# Patient Record
Sex: FEMALE | Race: WHITE | Employment: OTHER | URBAN - METROPOLITAN AREA
[De-identification: names, ages, dates, MRNs, and addresses within clinical notes are randomized per-mention and may not be internally consistent; named-entity substitution may affect disease eponyms.]

---

## 2019-08-30 ENCOUNTER — TELEPHONE (OUTPATIENT)
Dept: ORTHOPEDIC SURGERY | Age: 59
End: 2019-08-30

## 2019-09-03 DIAGNOSIS — Z96.653 HISTORY OF TOTAL BILATERAL KNEE REPLACEMENT: Primary | ICD-10-CM

## 2019-09-04 ENCOUNTER — OFFICE VISIT (OUTPATIENT)
Dept: ORTHOPEDIC SURGERY | Age: 59
End: 2019-09-04
Payer: COMMERCIAL

## 2019-09-04 VITALS — BODY MASS INDEX: 26.68 KG/M2 | WEIGHT: 166 LBS | HEIGHT: 66 IN

## 2019-09-04 DIAGNOSIS — Z96.653 HISTORY OF TOTAL BILATERAL KNEE REPLACEMENT: Primary | ICD-10-CM

## 2019-09-04 PROCEDURE — 99201 PR OFFICE OUTPATIENT NEW 10 MINUTES: CPT | Performed by: ORTHOPAEDIC SURGERY

## 2019-09-04 RX ORDER — ZOLPIDEM TARTRATE 10 MG/1
TABLET ORAL NIGHTLY PRN
COMMUNITY

## 2019-09-04 RX ORDER — FAMOTIDINE 20 MG/1
20 TABLET, FILM COATED ORAL 2 TIMES DAILY
COMMUNITY

## 2019-09-04 RX ORDER — CYCLOBENZAPRINE HCL 10 MG
10 TABLET ORAL 3 TIMES DAILY PRN
COMMUNITY

## 2019-09-04 NOTE — PROGRESS NOTES
History Narrative    Not on file       Past Medical History:   Diagnosis Date    Arthritis     Diverticulitis     Diverticulosis     Fibromyalgia      Past Surgical History:   Procedure Laterality Date    TOTAL KNEE ARTHROPLASTY Bilateral      Family History   Problem Relation Age of Onset    Diabetes Mother     Dementia Mother     Cancer Father     Heart Attack Brother          No orders of the defined types were placed in this encounter. 1. History of total bilateral knee replacement            Faheem Khan MD    Please note that this chart was generated using voice recognition Dragon dictation software. Although every effort was made to ensure the accuracy of this automated transcription, some errors in transcription may have occurred.

## 2019-10-09 ENCOUNTER — OFFICE VISIT (OUTPATIENT)
Dept: ORTHOPEDIC SURGERY | Age: 59
End: 2019-10-09
Payer: COMMERCIAL

## 2019-10-09 DIAGNOSIS — M25.562 CHRONIC PAIN OF BOTH KNEES: ICD-10-CM

## 2019-10-09 DIAGNOSIS — Z96.653 HISTORY OF TOTAL BILATERAL KNEE REPLACEMENT: Primary | ICD-10-CM

## 2019-10-09 DIAGNOSIS — M25.561 CHRONIC PAIN OF BOTH KNEES: ICD-10-CM

## 2019-10-09 DIAGNOSIS — G89.29 CHRONIC PAIN OF BOTH KNEES: ICD-10-CM

## 2019-10-09 PROCEDURE — 99212 OFFICE O/P EST SF 10 MIN: CPT | Performed by: ORTHOPAEDIC SURGERY

## 2019-10-15 ENCOUNTER — TELEPHONE (OUTPATIENT)
Dept: ORTHOPEDIC SURGERY | Age: 59
End: 2019-10-15

## 2019-10-15 DIAGNOSIS — Z79.2 PROPHYLACTIC ANTIBIOTIC: Primary | ICD-10-CM

## 2019-10-15 RX ORDER — AMOXICILLIN 500 MG/1
CAPSULE ORAL
Qty: 6 CAPSULE | Refills: 0 | Status: CANCELLED | OUTPATIENT
Start: 2019-10-15

## 2019-11-21 DIAGNOSIS — Z96.653 HISTORY OF TOTAL BILATERAL KNEE REPLACEMENT: Primary | ICD-10-CM

## 2019-11-21 RX ORDER — TRAMADOL HYDROCHLORIDE 50 MG/1
50-100 TABLET ORAL EVERY 6 HOURS PRN
Qty: 40 TABLET | Refills: 0 | Status: SHIPPED | OUTPATIENT
Start: 2019-11-21 | End: 2019-11-28

## 2019-11-22 ENCOUNTER — TELEPHONE (OUTPATIENT)
Dept: ORTHOPEDIC SURGERY | Age: 59
End: 2019-11-22

## 2019-12-05 ENCOUNTER — OFFICE VISIT (OUTPATIENT)
Dept: ORTHOPEDIC SURGERY | Age: 59
End: 2019-12-05

## 2019-12-05 DIAGNOSIS — Z96.653 HISTORY OF TOTAL BILATERAL KNEE REPLACEMENT: Primary | ICD-10-CM

## 2019-12-05 PROCEDURE — 99024 POSTOP FOLLOW-UP VISIT: CPT | Performed by: ORTHOPAEDIC SURGERY

## 2020-02-06 ENCOUNTER — OFFICE VISIT (OUTPATIENT)
Dept: ORTHOPEDIC SURGERY | Age: 60
End: 2020-02-06

## 2020-02-06 VITALS — BODY MASS INDEX: 26.68 KG/M2 | WEIGHT: 166.01 LBS | HEIGHT: 66 IN

## 2020-02-06 PROCEDURE — 99024 POSTOP FOLLOW-UP VISIT: CPT | Performed by: ORTHOPAEDIC SURGERY

## 2020-02-06 NOTE — PROGRESS NOTES
Anne Gilbert M.D.            [address] [place] [address], [address] [facility], [address] [place] [address]           Dept Phone: [phone]           Dept Fax:  [phone]           [place]          Dept Phone: [phone]           Dept Fax:  [phone]      Chief Compliant:  Chief Complaint   Patient presents with    Post-Op Check     2 month f/u 11/21/19 Lt patellar resurfacing        History of Present Illness:  Jose Billy returns today. This is a 60 y.o. female who presents to the clinic today  For 3 month follow up of status post left knee patellar surfacing on 11/21/2019. She states that she is doing great with no pain. She states that her right knee is now starting to bother her. She states she took off of school for 2 days due to pain in right knee. Review of Systems   Constitutional: Negative for fever, chills, sweats, recent illness, or recent injury. Neurological: Negative for headaches, numbness, or weakness. Integumentary: Negative for rash, itching, ecchymosis, abrasions, or laceration. Musculoskeletal: Positive for Post-Op Check (2 month f/u 11/21/19 Lt patellar resurfacing)       Physical Exam:  Constitutional: Patient is oriented to person, place, and time. Patient appears well-developed and well nourished. HENT: Negative otherwise noted  Head: Normocephalic and Atraumatic  Nose: Normal  Eyes: Conjunctivae and EOM are normal  Neck: Normal range of motion Neck supple. Respiratory/Cardio: Effort normal. No respiratory distress. Musculoskeletal:    Right knee: patellar clunk  After she hyperflexes her knee past 120 degree  Neurological: Patient is alert and oriented to person, place, and time. Normal strenght. No sensory deficit.   Skin: Skin is warm and dry  Psychiatric: Behavior is normal. Thought content normal.  Nursing note and vitals reviewed. Labs and Imaging:     None taken on today. Assessment and Plan:  1. Chronic pain of both knees            This is a 60 y.o. female who presents to the clinic today for follow up 3 month status post left knee patellar resurfacing on 11/21/2019. She is doing great with no pain post operatively. She was advised to sit with her right knee out straight. Follow up PRN.      Past History:    Current Outpatient Medications:     famotidine (PEPCID) 20 MG tablet, Take 20 mg by mouth 2 times daily, Disp: , Rfl:     zolpidem (AMBIEN) 10 MG tablet, Take by mouth nightly as needed for Sleep., Disp: , Rfl:     cyclobenzaprine (FLEXERIL) 10 MG tablet, Take 10 mg by mouth 3 times daily as needed for Muscle spasms, Disp: , Rfl:   Allergies   Allergen Reactions    Ceclor [Cefaclor] Hives and Swelling    Gluten Meal Other (See Comments)     Social History     Socioeconomic History    Marital status: Single     Spouse name: Not on file    Number of children: Not on file    Years of education: Not on file    Highest education level: Not on file   Occupational History    Not on file   Social Needs    Financial resource strain: Not on file    Food insecurity:     Worry: Not on file     Inability: Not on file    Transportation needs:     Medical: Not on file     Non-medical: Not on file   Tobacco Use    Smoking status: Never Smoker    Smokeless tobacco: Never Used   Substance and Sexual Activity    Alcohol use: Not on file    Drug use: Not on file    Sexual activity: Not on file   Lifestyle    Physical activity:     Days per week: Not on file     Minutes per session: Not on file    Stress: Not on file   Relationships    Social connections:     Talks on phone: Not on file     Gets together: Not on file     Attends Denominational service: Not on file     Active member of club or organization: Not on file     Attends meetings of clubs or organizations: Not on file     Relationship status: Not on file   Javier Renteria

## 2021-01-28 ENCOUNTER — OFFICE VISIT (OUTPATIENT)
Dept: ORTHOPEDIC SURGERY | Age: 61
End: 2021-01-28
Payer: COMMERCIAL

## 2021-01-28 DIAGNOSIS — Z96.653 HISTORY OF TOTAL BILATERAL KNEE REPLACEMENT: Primary | ICD-10-CM

## 2021-01-28 PROCEDURE — 99213 OFFICE O/P EST LOW 20 MIN: CPT | Performed by: ORTHOPAEDIC SURGERY

## 2021-01-28 PROCEDURE — 3017F COLORECTAL CA SCREEN DOC REV: CPT | Performed by: ORTHOPAEDIC SURGERY

## 2021-01-28 PROCEDURE — 1036F TOBACCO NON-USER: CPT | Performed by: ORTHOPAEDIC SURGERY

## 2021-01-28 PROCEDURE — G8484 FLU IMMUNIZE NO ADMIN: HCPCS | Performed by: ORTHOPAEDIC SURGERY

## 2021-01-28 PROCEDURE — G8419 CALC BMI OUT NRM PARAM NOF/U: HCPCS | Performed by: ORTHOPAEDIC SURGERY

## 2021-01-28 PROCEDURE — G8427 DOCREV CUR MEDS BY ELIG CLIN: HCPCS | Performed by: ORTHOPAEDIC SURGERY

## 2021-01-28 NOTE — PROGRESS NOTES
Yolanda Garcia M.D.            [address] [facility] [address] [facility],[address], [address] [facility]           Dept Phone: [phone]           Dept Fax:  [phone]           [place]          Dept Phone: [phone]           Dept Fax:  [phone]      Chief Compliant:  Chief Complaint   Patient presents with    Knee Pain     right         History of Present Illness: This is a 63 y.o. female who presents to the clinic today for evaluation / follow up of right knee pain. Please see prior dictations. Joo de la cruz had bilateral total knees done elsewhere. She had been seen by me approximately 2 years ago for significant patellofemoral pain on the left side. She had significant maltracking/tilt of the patella and we did going to do resurfacing of the patella on the left knee. She is extremely happy with this knee she has no problems whatsoever but she is now experiencing similar findings and feeling symptoms on the right side. She states that a certain degree flexion she gets a snapping catching popping sensation especially when rising out of a chair. Review of Systems   Constitutional: Negative for fever, chills, sweats. Eyes: Negative for changes in vision, or pain. HENT: Negative for ear ache, epistaxis, or sore throat. Respiratory/Cardio: Negative for Chest pain, palpitations, SOB, or cough. Gastrointestinal: Negative for abdominal pain, N/V/D. Genitourinary: Negative for dysuria, frequency, urgency, or hematuria. Neurological: Negative for headache, numbness, or weakness. Integumentary: Negative for rash, itching, laceration, or abrasion. Musculoskeletal: Positive for Knee Pain (right )       Physical Exam:  Constitutional: Patient is oriented to person, place, and time. Patient appears well-developed and well nourished. HENT: Negative otherwise noted  Head: Normocephalic and Atraumatic  Nose: Normal  Eyes: Conjunctivae and EOM are normal  Neck: Normal range of motion Neck supple. Respiratory/Cardio: Effort normal. No respiratory distress. Musculoskeletal: Examination of patient's right knee notes she has excellent motion 0135 degrees. She does have however going from flexion into extension the rather painful popping/clunking sensation. She does seem to track nicely however appears to be a engaging problem. She has no obvious varus and valgus instability no anterior posterior instability no other contributory findings  Neurological: Patient is alert and oriented to person, place, and time. Normal strenght. No sensory deficit. Skin: Skin is warm and dry  Psychiatric: Behavior is normal. Thought content normal.  Nursing note and vitals reviewed. Labs and Imaging:     XR taken today:  Xr Knee Bilateral Standing    Result Date: 1/28/2021  Rays taken today reviewed by me show AP lateral views of both knees as well as skyline views. AP lateral views were told unremarkable on either knee. Patient is skyline view does show on the right knee is some mild bony impingement along the medial facet. It is well stabilized and centralized within the trochlear groove        No orders of the defined types were placed in this encounter. Assessment and Plan:  Previous patellar resurfacing left knee  Patellar clunk syndrome right        This is a 63 y.o. female who presents to the clinic today for evaluation / follow up of painful right total knee patellar clunk.      Past History:    Current Outpatient Medications:     famotidine (PEPCID) 20 MG tablet, Take 20 mg by mouth 2 times daily, Disp: , Rfl:     zolpidem (AMBIEN) 10 MG tablet, Take by mouth nightly as needed for Sleep., Disp: , Rfl:     cyclobenzaprine (FLEXERIL) 10 MG tablet, Take 10 mg by mouth 3 times daily as needed for Muscle spasms, Disp: , Rfl:   Allergies Allergen Reactions    Ceclor [Cefaclor] Hives and Swelling    Gluten Meal Other (See Comments)     Social History     Socioeconomic History    Marital status: Single     Spouse name: Not on file    Number of children: Not on file    Years of education: Not on file    Highest education level: Not on file   Occupational History    Not on file   Social Needs    Financial resource strain: Not on file    Food insecurity     Worry: Not on file     Inability: Not on file    Transportation needs     Medical: Not on file     Non-medical: Not on file   Tobacco Use    Smoking status: Never Smoker    Smokeless tobacco: Never Used   Substance and Sexual Activity    Alcohol use: Not on file    Drug use: Not on file    Sexual activity: Not on file   Lifestyle    Physical activity     Days per week: Not on file     Minutes per session: Not on file    Stress: Not on file   Relationships    Social connections     Talks on phone: Not on file     Gets together: Not on file     Attends Religion service: Not on file     Active member of club or organization: Not on file     Attends meetings of clubs or organizations: Not on file     Relationship status: Not on file    Intimate partner violence     Fear of current or ex partner: Not on file     Emotionally abused: Not on file     Physically abused: Not on file     Forced sexual activity: Not on file   Other Topics Concern    Not on file   Social History Narrative    Not on file     Past Medical History:   Diagnosis Date    Arthritis     Diverticulitis     Diverticulosis     Fibromyalgia      Past Surgical History:   Procedure Laterality Date    TOTAL KNEE ARTHROPLASTY Bilateral      Family History   Problem Relation Age of Onset    Diabetes Mother     Dementia Mother     Cancer Father     Heart Attack Brother       Plan I did inform the patient that she has similar findings of what she had in her left side. As such I think that if she is symptomatically limited by this and then I would consider doing an identical procedure. She does seem to agree with this however if she would like to wait till later date especially regarding the return to school status. We will see her back here on an as-needed basis and/or she may call and schedule at her discretion    Provider Attestation:  Rocio Painting, personally performed the services described in this documentation. All medical record entries made by the scribe were at my direction and in my presence. I have reviewed the chart and discharge instructions and agree that the records reflect my personal performance and is accurate and complete. Kay Ramos MD. 01/28/21      Please note that this chart was generated using voice recognition Dragon dictation software. Although every effort was made to ensure the accuracy of this automated transcription, some errors in transcription may have occurred.

## 2021-03-29 ENCOUNTER — OFFICE VISIT (OUTPATIENT)
Dept: ORTHOPEDIC SURGERY | Age: 61
End: 2021-03-29
Payer: COMMERCIAL

## 2021-03-29 VITALS — HEIGHT: 67 IN | BODY MASS INDEX: 26.06 KG/M2 | WEIGHT: 166 LBS

## 2021-03-29 DIAGNOSIS — M25.361 PATELLAR INSTABILITY OF RIGHT KNEE: ICD-10-CM

## 2021-03-29 DIAGNOSIS — Z96.653 HISTORY OF TOTAL BILATERAL KNEE REPLACEMENT: Primary | ICD-10-CM

## 2021-03-29 PROCEDURE — G8427 DOCREV CUR MEDS BY ELIG CLIN: HCPCS | Performed by: ORTHOPAEDIC SURGERY

## 2021-03-29 PROCEDURE — 1036F TOBACCO NON-USER: CPT | Performed by: ORTHOPAEDIC SURGERY

## 2021-03-29 PROCEDURE — G8419 CALC BMI OUT NRM PARAM NOF/U: HCPCS | Performed by: ORTHOPAEDIC SURGERY

## 2021-03-29 PROCEDURE — G8484 FLU IMMUNIZE NO ADMIN: HCPCS | Performed by: ORTHOPAEDIC SURGERY

## 2021-03-29 PROCEDURE — 99214 OFFICE O/P EST MOD 30 MIN: CPT | Performed by: ORTHOPAEDIC SURGERY

## 2021-03-29 PROCEDURE — 3017F COLORECTAL CA SCREEN DOC REV: CPT | Performed by: ORTHOPAEDIC SURGERY

## 2021-03-29 ASSESSMENT — ENCOUNTER SYMPTOMS
APNEA: 0
CHEST TIGHTNESS: 0
COUGH: 0
VOMITING: 0
ABDOMINAL PAIN: 0

## 2021-03-29 NOTE — PROGRESS NOTES
[facility]  [facility]  [id number] [facility] [address]  Dept: [phone]  Dept Fax: [phone]        Ambulatory Follow Up      Subjective:   Kamilah Montalvo is a 63y.o. year old female who presents to our office today for routine followup regarding her   1. History of total bilateral knee replacement    2. Patellar instability of right knee    . Chief Complaint   Patient presents with    Knee Pain     right       HPI-this 59-year-old female patient presents to the office today for recheck of her bilateral knees. She underwent bilateral total knee arthroplasty in 2015 by Dr. Monica Wong. She was never really happy with either knee because of catching around the kneecaps. In 2019 she underwent revision patellar arthroplasty on the left with Dr. Paulina Mayes and she is extremely happy with the knee now. She feels like she is having the exact same symptoms on the right knee that have been persistent since her initial surgery in 2015. She noticed catching and locking of the patella and states that this has become painful and intolerable over time. She also feels like occasionally that her knee will catch and lock and cause her to feel unstable like she is going to fall because of it. She denies any fevers, chills, nausea, vomiting, diarrhea or any constitutional symptoms such as might be seen in infection. Review of Systems   Constitutional: Negative for chills and fever. Respiratory: Negative for apnea, cough and chest tightness. Cardiovascular: Negative for chest pain and palpitations. Gastrointestinal: Negative for abdominal pain and vomiting. Genitourinary: Negative for difficulty urinating. Musculoskeletal: Positive for arthralgias (right knee). Negative for gait problem, joint swelling and myalgias. Neurological: Negative for dizziness, weakness and numbness.        I have reviewed the CC, HPI, ROS, PMH, FHX, Social History, and if not present in this note, I have reviewed in the patient's chart. I agree with the documentation provided by other staff and have reviewed their documentation prior to providing my signature indicating agreement. Objective :   Ht 5' 7\" (1.702 m)   Wt 166 lb (75.3 kg)   BMI 26.00 kg/m²  Body mass index is 26 kg/m². General: Zofia Schmidt is a 63 y.o. female who is alert and oriented and sitting comfortably in our office. Ortho Exam  MS: Evaluation of the left knee reveals a nice well-healed midline incision. Patient has full range of motion of the left knee. Patellar tracking is appropriate without catching. Evaluation of the right knee reveals no significant outward deformity. Midline incision is well-healed. No signs of infection are noted. Catching of the patella when going from a flexed to an extended position and from an extended to a flexed position is noted. Palpable crepitance is appreciated. Tenderness over the patella is noted. No instability of the right knee is appreciated. Negative hip logroll and Stinchfield test is noted on the right. Motor, sensory, vascular examination of the right lower extremity is grossly intact without focal deficits. Neuro: alert and oriented to person and place. Eyes: Extra-ocular muscles intact  Mouth: Oral mucosa moist. No perioral lesions  Pulm: Respirations unlabored and regular. Symmetric chest excursion without outward deformity is noted. Skin: warm, well perfused  Psych:   Patient has good fund of knowledge and displays understanging of exam, diagnosis, and plan. Radiology:     Previous x-rays from 28 January 2021 were reviewed today. No significant deformity or signs of loosening or problems with the patella or the total knee arthroplasty are appreciated when reviewing the right knee x-rays from that date. Assessment:      1. History of total bilateral knee replacement    2.  Patellar instability of right knee       Plan:      Discussed etiology and natural history of patellar catching and locking. This may be from a cyclops lesion but it could also be from the patellar implant having complications. The treatment options may include oral anti-inflammatories, bracing, injections, advanced imaging, activity modification, physical therapy and/or surgical intervention. the patient notes the symptoms she is having on the right are exactly the same symptoms she has had on the left that improved 100% after undergoing revision patellar arthroplasty. She would like to proceed with revision patellar arthroplasty on the right. I spoke with the patient at length that this may not resolve the issue completely and she notes understanding. All details of the procedure, as well as risks, benefits and alternatives, including the option of non operative versus operative treatment were discussed. The patient understands that risks of the surgery include but are not limited to: bleeding, malunion/nonunion, loss of fixation, loss of reduction, hardware failure, angular or rotational deformity, length discrepancy, limp, transfusion, skin blistering or breakdown, progressive post traumatic degenerative joint disease, possible need for further surgery, bone grafting, infection, nerve injury, paralysis, numbness, blood vessel injury, excessive scaring, wound complication or breakdown, failure of symptoms to improve or actual deterioration in condition, significant acute and/or chronic pain, possible need for amputation, permanent loss of motion, and permanent loss of function.   As well as the general complications of anesthesia, which include but are not limited to: myocardial infarction and/or heart attack, stroke, multi organ system failure or even possible death, prolonged hospital stay, blood clots, pulmonary embolism, abnormal reaction to medication, visual and neurological disturbances, constipation, ischemic bowel, bowel obstruction, bowel perforation, ileus and mental status changes. No guarantees were made. Follow up:No follow-ups on file. No orders of the defined types were placed in this encounter. No orders of the defined types were placed in this encounter. Lucy Galvez LPN am scribing for and in the presence of Dr. Kanika Jay  3/29/2021 8:55 AM      I have reviewed and made changes accordingly to the work scribed by Tristan Mora LPN. The documentation accurately reflects work and decisions made by me. I have also reviewed documentation completed by clinical staff.     Kanika Jay DO, [address] [facility]  3/29/2021 9:01 AM    This note is created with the assistance of a speech recognition program.  While intending to generate a document that actually reflects the content of the visit, the document can still have some errors including those of syntax and sound a like substitutions which may escape proof reading.  In such instances, actual meaning can be extrapolated by contextual diversion      Electronically signed by Corinda Nissen on 3/29/2021 at 8:55 AM

## 2021-04-02 ENCOUNTER — TELEPHONE (OUTPATIENT)
Dept: ORTHOPEDIC SURGERY | Age: 61
End: 2021-04-02

## 2021-04-02 DIAGNOSIS — Z01.818 PRE-OP TESTING: Primary | ICD-10-CM

## 2021-04-05 DIAGNOSIS — Z96.653 HISTORY OF TOTAL BILATERAL KNEE REPLACEMENT: Primary | ICD-10-CM

## 2021-04-05 RX ORDER — AMOXICILLIN 500 MG/1
CAPSULE ORAL
Qty: 6 CAPSULE | Refills: 0 | Status: SHIPPED | OUTPATIENT
Start: 2021-04-05 | End: 2021-10-04 | Stop reason: SDUPTHER

## 2021-05-18 ASSESSMENT — PROMIS GLOBAL HEALTH SCALE
TO WHAT EXTENT ARE YOU ABLE TO CARRY OUT YOUR EVERYDAY PHYSICAL ACTIVITIES SUCH AS WALKING, CLIMBING STAIRS, CARRYING GROCERIES, OR MOVING A CHAIR [ON A SCALE OF 1 (NOT AT ALL) TO 5 (COMPLETELY)]?: 4
SUM OF RESPONSES TO QUESTIONS 2, 4, 5, & 10: 15
HOW IS THE PROMIS V1.1 BEING ADMINISTERED?: 2
IN GENERAL, WOULD YOU SAY YOUR QUALITY OF LIFE IS...[ON A SCALE OF 1 (POOR) TO 5 (EXCELLENT)]: 4
IN GENERAL, HOW WOULD YOU RATE YOUR SATISFACTION WITH YOUR SOCIAL ACTIVITIES AND RELATIONSHIPS [ON A SCALE OF 1 (POOR) TO 5 (EXCELLENT)]?: 4
WHO IS THE PERSON COMPLETING THE PROMIS V1.1 SURVEY?: 0
IN GENERAL, HOW WOULD YOU RATE YOUR PHYSICAL HEALTH [ON A SCALE OF 1 (POOR) TO 5 (EXCELLENT)]?: 5
IN THE PAST 7 DAYS, HOW OFTEN HAVE YOU BEEN BOTHERED BY EMOTIONAL PROBLEMS, SUCH AS FEELING ANXIOUS, DEPRESSED, OR IRRITABLE [ON A SCALE FROM 1 (NEVER) TO 5 (ALWAYS)]?: 3
IN GENERAL, PLEASE RATE HOW WELL YOU CARRY OUT YOUR USUAL SOCIAL ACTIVITIES (INCLUDES ACTIVITIES AT HOME, AT WORK, AND IN YOUR COMMUNITY, AND RESPONSIBILITIES AS A PARENT, CHILD, SPOUSE, EMPLOYEE, FRIEND, ETC) [ON A SCALE OF 1 (POOR) TO 5 (EXCELLENT)]?: 4
IN GENERAL, HOW WOULD YOU RATE YOUR MENTAL HEALTH, INCLUDING YOUR MOOD AND YOUR ABILITY TO THINK [ON A SCALE OF 1 (POOR) TO 5 (EXCELLENT)]?: 4
SUM OF RESPONSES TO QUESTIONS 3, 6, 7, & 8: 19
IN GENERAL, WOULD YOU SAY YOUR HEALTH IS...[ON A SCALE OF 1 (POOR) TO 5 (EXCELLENT)]: 5
IN THE PAST 7 DAYS, HOW WOULD YOU RATE YOUR FATIGUE ON AVERAGE [ON A SCALE FROM 1 (NONE) TO 5 (VERY SEVERE)]?: 3
IN THE PAST 7 DAYS, HOW WOULD YOU RATE YOUR PAIN ON AVERAGE [ON A SCALE FROM 0 (NO PAIN) TO 10 (WORST IMAGINABLE PAIN)]?: 7

## 2021-05-18 ASSESSMENT — KOOS JR
RISING FROM SITTING: 2
GOING UP OR DOWN STAIRS: 0
BENDING TO THE FLOOR TO PICK UP OBJECT: 2
HOW SEVERE IS YOUR KNEE STIFFNESS AFTER FIRST WAKING IN MORNING: 1
TWISING OR PIVOTING ON KNEE: 1
STANDING UPRIGHT: 0
STRAIGHTENING KNEE FULLY: 2

## 2021-05-24 RX ORDER — SODIUM CHLORIDE, SODIUM LACTATE, POTASSIUM CHLORIDE, CALCIUM CHLORIDE 600; 310; 30; 20 MG/100ML; MG/100ML; MG/100ML; MG/100ML
1000 INJECTION, SOLUTION INTRAVENOUS CONTINUOUS
Status: CANCELLED | OUTPATIENT
Start: 2021-05-24

## 2021-05-26 ENCOUNTER — HOSPITAL ENCOUNTER (OUTPATIENT)
Dept: GENERAL RADIOLOGY | Age: 61
Discharge: HOME | End: 2021-05-28
Payer: COMMERCIAL

## 2021-05-26 ENCOUNTER — HOSPITAL ENCOUNTER (OUTPATIENT)
Dept: PREADMISSION TESTING | Age: 61
Discharge: HOME | End: 2021-05-30
Payer: COMMERCIAL

## 2021-05-26 VITALS
WEIGHT: 164 LBS | TEMPERATURE: 97 F | DIASTOLIC BLOOD PRESSURE: 76 MMHG | BODY MASS INDEX: 25.74 KG/M2 | HEIGHT: 67 IN | HEART RATE: 85 BPM | RESPIRATION RATE: 18 BRPM | OXYGEN SATURATION: 99 % | SYSTOLIC BLOOD PRESSURE: 129 MMHG

## 2021-05-26 DIAGNOSIS — Z01.818 PRE-OP TESTING: ICD-10-CM

## 2021-05-26 LAB
ALBUMIN SERPL-MCNC: 4.5 G/DL (ref 3.5–5.2)
ALBUMIN/GLOBULIN RATIO: 1.3 (ref 1–2.5)
ALP BLD-CCNC: 96 U/L (ref 35–104)
ALT SERPL-CCNC: 22 U/L (ref 5–33)
ANION GAP SERPL CALCULATED.3IONS-SCNC: 7 MMOL/L (ref 9–17)
AST SERPL-CCNC: 32 U/L
BILIRUB SERPL-MCNC: 0.21 MG/DL (ref 0.3–1.2)
BILIRUBIN URINE: NEGATIVE
BUN BLDV-MCNC: 10 MG/DL (ref 8–23)
BUN/CREAT BLD: ABNORMAL (ref 9–20)
CALCIUM SERPL-MCNC: 9.5 MG/DL (ref 8.6–10.4)
CHLORIDE BLD-SCNC: 101 MMOL/L (ref 98–107)
CO2: 28 MMOL/L (ref 20–31)
COLOR: YELLOW
COMMENT UA: ABNORMAL
CREAT SERPL-MCNC: 0.58 MG/DL (ref 0.5–0.9)
GFR AFRICAN AMERICAN: >60 ML/MIN
GFR NON-AFRICAN AMERICAN: >60 ML/MIN
GFR SERPL CREATININE-BSD FRML MDRD: ABNORMAL ML/MIN/{1.73_M2}
GFR SERPL CREATININE-BSD FRML MDRD: ABNORMAL ML/MIN/{1.73_M2}
GLUCOSE BLD-MCNC: 95 MG/DL (ref 70–99)
GLUCOSE URINE: NEGATIVE
HCT VFR BLD CALC: 43.7 % (ref 36.3–47.1)
HEMOGLOBIN: 14 G/DL (ref 11.9–15.1)
KETONES, URINE: NEGATIVE
LEUKOCYTE ESTERASE, URINE: NEGATIVE
MCH RBC QN AUTO: 30.4 PG (ref 25.2–33.5)
MCHC RBC AUTO-ENTMCNC: 32 G/DL (ref 28.4–34.8)
MCV RBC AUTO: 95 FL (ref 82.6–102.9)
NITRITE, URINE: NEGATIVE
NRBC AUTOMATED: 0 PER 100 WBC
PDW BLD-RTO: 13 % (ref 11.8–14.4)
PH UA: 5.5 (ref 5–8)
PLATELET # BLD: 326 K/UL (ref 138–453)
PMV BLD AUTO: 9.7 FL (ref 8.1–13.5)
POTASSIUM SERPL-SCNC: 4.5 MMOL/L (ref 3.7–5.3)
PROTEIN UA: NEGATIVE
RBC # BLD: 4.6 M/UL (ref 3.95–5.11)
SODIUM BLD-SCNC: 136 MMOL/L (ref 135–144)
SPECIFIC GRAVITY UA: 1 (ref 1–1.03)
TOTAL PROTEIN: 8.1 G/DL (ref 6.4–8.3)
TURBIDITY: CLEAR
URINE HGB: NEGATIVE
UROBILINOGEN, URINE: NORMAL
WBC # BLD: 7 K/UL (ref 3.5–11.3)

## 2021-05-26 PROCEDURE — 85027 COMPLETE CBC AUTOMATED: CPT

## 2021-05-26 PROCEDURE — 71046 X-RAY EXAM CHEST 2 VIEWS: CPT

## 2021-05-26 PROCEDURE — 80053 COMPREHEN METABOLIC PANEL: CPT

## 2021-05-26 PROCEDURE — 93005 ELECTROCARDIOGRAM TRACING: CPT

## 2021-05-26 PROCEDURE — 87641 MR-STAPH DNA AMP PROBE: CPT

## 2021-05-26 PROCEDURE — 36415 COLL VENOUS BLD VENIPUNCTURE: CPT

## 2021-05-26 PROCEDURE — 81003 URINALYSIS AUTO W/O SCOPE: CPT

## 2021-05-26 RX ORDER — OMEGA-3 FATTY ACIDS/FISH OIL 300-1000MG
1 CAPSULE ORAL NIGHTLY
COMMUNITY

## 2021-05-26 RX ORDER — ONDANSETRON 4 MG/1
TABLET, ORALLY DISINTEGRATING ORAL
COMMUNITY
Start: 2021-03-08

## 2021-05-26 ASSESSMENT — PAIN DESCRIPTION - DESCRIPTORS: DESCRIPTORS: ACHING

## 2021-05-26 ASSESSMENT — PAIN DESCRIPTION - PAIN TYPE: TYPE: CHRONIC PAIN

## 2021-05-26 ASSESSMENT — PAIN DESCRIPTION - FREQUENCY: FREQUENCY: CONTINUOUS

## 2021-05-26 ASSESSMENT — PAIN SCALES - GENERAL: PAINLEVEL_OUTOF10: 2

## 2021-05-26 NOTE — H&P
History and Physical    Pt Name: Glendy Camarillo  MRN: [id number]  YOB: 1960  Date of evaluation: 5/26/2021  Primary Care Physician: JENN Vargas - CNP    SUBJECTIVE:   History of Chief Complaint:    Glendy Camarillo is a 63 y.o. female who presents for PAT appointment. Patient complains of right patellar instability. She reports being s/p bilateral total knee replacement in 2015 with Dr. Lance Ko. She then had left revision surgery with Dr. Meme Francisco with satisfactory results. Patient has been scheduled for PATELLAR REVISION ARTHROPLASTY- RIGHT. Allergies  is allergic to ceclor [cefaclor] and gluten meal.  Medications  Prior to Admission medications    Medication Sig Start Date End Date Taking? Authorizing Provider   Hyoscyamine Sulfate (LEVSIN/SL SL) Place under the tongue as needed   Yes Historical Provider, MD   Polyethylene Glycol 3350 (MIRALAX PO) Take by mouth every other day   Yes Historical Provider, MD   Probiotic Product (PROBIOTIC DAILY PO) Take by mouth Daily   Yes Historical Provider, MD   BIOTIN PO Take by mouth Daily   Yes Historical Provider, MD   Methylcellulose, Laxative, (CITRUCEL PO) Take by mouth nightly   Yes Historical Provider, MD   ibuprofen (ADVIL) 200 MG CAPS Take 1 capsule by mouth nightly   Yes Historical Provider, MD   amoxicillin (AMOXIL) 500 MG capsule Take 2 caps 1 hour prior to appointment, then 1 cap twice a day until gone. 4/5/21  Yes TRACI Sol   famotidine (PEPCID) 20 MG tablet Take 20 mg by mouth 2 times daily   Yes Historical Provider, MD   zolpidem (AMBIEN) 10 MG tablet Take by mouth nightly as needed for Sleep.    Yes Historical Provider, MD   cyclobenzaprine (FLEXERIL) 10 MG tablet Take 10 mg by mouth 3 times daily as needed for Muscle spasms   Yes Historical Provider, MD   ondansetron (ZOFRAN-ODT) 4 MG disintegrating tablet dissolve 1 tablet ON TONGUE every 8 hours if needed for nausea OR vomiting 3/8/21   Historical Provider, MD Past Medical History    has a past medical history of Arthritis, Diverticulitis, Diverticulosis, Fibrocystic breast, Fibromyalgia, Health care maintenance, IBS (irritable bowel syndrome), PONV (postoperative nausea and vomiting), and Sleep apnea. Past Surgical History   has a past surgical history that includes Total knee arthroplasty (Bilateral); Colonoscopy; Endoscopy, colon, diagnostic; Cholecystectomy; Breast lumpectomy (Bilateral); Wrist surgery (Right); and LASIK (Bilateral). Social History   reports that she has never smoked. She has never used smokeless tobacco.    reports current alcohol use.    has no history on file for drug use. 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 Review of Systems:  CONSTITUTIONAL:   negative for fevers, chills, fatigue and malaise    EYES:   negative for double vision, blurred vision and photophobia    HEENT:   negative for tinnitus, epistaxis and sore throat     RESPIRATORY:   negative for cough, shortness of breath, wheezing     CARDIOVASCULAR:   negative for chest pain, palpitations, syncope, edema     GASTROINTESTINAL:   negative for nausea, vomiting     GENITOURINARY:   negative for incontinence     MUSCULOSKELETAL:   +chronic pain, fibromyalgia   NEUROLOGICAL:   Negative for weakness and tingling  negative for headaches and dizziness     PSYCHIATRIC:   negative for anxiety       OBJECTIVE:   VITALS:  height is 5' 7\" (1.702 m) and weight is 164 lb (74.4 kg). Her temporal temperature is 97 °F (36.1 °C). Her blood pressure is 129/76 and her pulse is 85. Her respiration is 18 and oxygen saturation is 99%. CONSTITUTIONAL:alert & oriented x 3, no acute distress. Friendly.

## 2021-05-26 NOTE — H&P (VIEW-ONLY)
History and Physical    Pt Name: Luis Hitchcock  MRN: [id number]  YOB: 1960  Date of evaluation: 5/26/2021  Primary Care Physician: JENN Valentine - CNP    SUBJECTIVE:   History of Chief Complaint:    Luis Hitchcock is a 63 y.o. female who presents for PAT appointment. Patient complains of right patellar instability. She reports being s/p bilateral total knee replacement in 2015 with Dr. Mila Willingham. She then had left revision surgery with Dr. Deann Tian with satisfactory results. Patient has been scheduled for PATELLAR REVISION ARTHROPLASTY- RIGHT. Allergies  is allergic to ceclor [cefaclor] and gluten meal.  Medications  Prior to Admission medications    Medication Sig Start Date End Date Taking? Authorizing Provider   Hyoscyamine Sulfate (LEVSIN/SL SL) Place under the tongue as needed   Yes Historical Provider, MD   Polyethylene Glycol 3350 (MIRALAX PO) Take by mouth every other day   Yes Historical Provider, MD   Probiotic Product (PROBIOTIC DAILY PO) Take by mouth Daily   Yes Historical Provider, MD   BIOTIN PO Take by mouth Daily   Yes Historical Provider, MD   Methylcellulose, Laxative, (CITRUCEL PO) Take by mouth nightly   Yes Historical Provider, MD   ibuprofen (ADVIL) 200 MG CAPS Take 1 capsule by mouth nightly   Yes Historical Provider, MD   amoxicillin (AMOXIL) 500 MG capsule Take 2 caps 1 hour prior to appointment, then 1 cap twice a day until gone. 4/5/21  Yes TRACI Horner   famotidine (PEPCID) 20 MG tablet Take 20 mg by mouth 2 times daily   Yes Historical Provider, MD   zolpidem (AMBIEN) 10 MG tablet Take by mouth nightly as needed for Sleep.    Yes Historical Provider, MD   cyclobenzaprine (FLEXERIL) 10 MG tablet Take 10 mg by mouth 3 times daily as needed for Muscle spasms   Yes Historical Provider, MD   ondansetron (ZOFRAN-ODT) 4 MG disintegrating tablet dissolve 1 tablet ON TONGUE every 8 hours if needed for nausea OR vomiting 3/8/21   Historical Provider, MD pleasant. SKIN:  Warm and dry, no rashes on exposed areas of skin   HEAD:  Normocephalic, atraumatic   EYES: PERRL. EOMs intact. EARS:  Equal bilaterally, no edema or thickening, skin is intact without lumps or lesions. No discharge. Hearing grossly WNL. NOSE:  Nares patent. No rhinorrhea   MOUTH/THROAT:  Mucous membranes moist, tongue is pink, uvula midline, teeth appear to be intact  NECK:supple, no lymphadenopathy  LUNGS: Respirations even and non-labored. Clear to auscultation bilaterally, no wheezes, rales, or rhonchi. CARDIOVASCULAR: Regular rate and rhythm, no murmurs/rubs/gallops   ABDOMEN: soft, non-tender, non-distended, bowel sounds active x 4   EXTREMITIES: No edema bilateral lower extremities. No varicosities bilateral lower extremities. NEUROLOGIC: CN II-XII are grossly intact. Gait is smooth, rhythmic and effortless     Testing:   EK21  Labs pending: drawn 2021   Chest XRay:  21  MRSA nasal swab: 21      IMPRESSIONS:   Right patella instability       Diagnosis Date    Arthritis     Diverticulitis     Diverticulosis     Fibrocystic breast     Fibromyalgia     Health care maintenance     PCP:  Megan Fuller CNP.   next visit 21    IBS (irritable bowel syndrome)     PONV (postoperative nausea and vomiting)     Sleep apnea     very mild, no machine     PLANS:   PATELLAR REVISION ARTHROPLASTY- RIGHT    JENN HADLEY CNP- CNP  Electronically signed 2021 at 3:05 PM

## 2021-05-26 NOTE — PROGRESS NOTES
Anesthesia Focused Assessment    Hx of anesthesia complications:  PONV, prolonged emergence  Family hx of anesthesia complications:  no      STOP-BANG Sleep Apnea Questionnaire    Prior + Covid-19 test? no      SNORE loudly (heard through closed doors)? No  TIRED, fatigued, sleepy during daytime? No  OBSERVED stopping breathing during sleep? No  High blood PRESSURE or being treated? No    BMI over 35? No  AGE over 48? Yes  NECK circumference over 16\"? No  GENDER (male)? No             Total 1  High risk 5-8  Intermediate risk 3-4  Low risk 0-2    ----------------------------------------------------------------------------------------------------------------------  GABRIELA:                                             Yes, mild on sleep study  If yes, machine?:                          no    Type 1 DM:                                   no  T2DM:                                           no    Coronary Artery Disease:             no  Hypertension:                               no  Defib / AICD / Pacemaker:           no    Renal Failure:                               no  If yes, on dialysis? :                           Active smoker:                              no  Drinks Alcohol:                              1 glass of wine per day  Illicit drugs:                                    no    Dentition:                                      benign    Past Medical History:   Diagnosis Date    Arthritis     Diverticulitis     Diverticulosis     Fibrocystic breast     Fibromyalgia     Health care maintenance     PCP:  Norrine Krabbe CNP. next visit 6/2/21    IBS (irritable bowel syndrome)     PONV (postoperative nausea and vomiting)     Sleep apnea     very mild, no machine         Patient was evaluated in PAT & anesthesia guidelines were applied. NPO guidelines, medication instructions and were reviewed with patient.  Time of arrival per surgeon

## 2021-05-27 LAB
EKG ATRIAL RATE: 64 BPM
EKG P AXIS: 11 DEGREES
EKG P-R INTERVAL: 144 MS
EKG Q-T INTERVAL: 418 MS
EKG QRS DURATION: 78 MS
EKG QTC CALCULATION (BAZETT): 431 MS
EKG R AXIS: 69 DEGREES
EKG T AXIS: 57 DEGREES
EKG VENTRICULAR RATE: 64 BPM
MRSA, DNA, NASAL: NORMAL
SPECIMEN DESCRIPTION: NORMAL

## 2021-05-27 PROCEDURE — 93010 ELECTROCARDIOGRAM REPORT: CPT | Performed by: INTERNAL MEDICINE

## 2021-05-28 DIAGNOSIS — Z96.653 HISTORY OF TOTAL BILATERAL KNEE REPLACEMENT: ICD-10-CM

## 2021-05-28 DIAGNOSIS — M25.361 PATELLAR INSTABILITY OF RIGHT KNEE: Primary | ICD-10-CM

## 2021-06-08 ENCOUNTER — ANESTHESIA EVENT (OUTPATIENT)
Dept: OPERATING ROOM | Age: 61
End: 2021-06-08
Payer: COMMERCIAL

## 2021-06-08 ENCOUNTER — HOSPITAL ENCOUNTER (OUTPATIENT)
Age: 61
Setting detail: OBSERVATION
Discharge: HOME | End: 2021-06-09
Attending: ORTHOPAEDIC SURGERY | Admitting: ORTHOPAEDIC SURGERY
Payer: COMMERCIAL

## 2021-06-08 ENCOUNTER — ANESTHESIA (OUTPATIENT)
Dept: OPERATING ROOM | Age: 61
End: 2021-06-08
Payer: COMMERCIAL

## 2021-06-08 ENCOUNTER — APPOINTMENT (OUTPATIENT)
Dept: GENERAL RADIOLOGY | Age: 61
End: 2021-06-08
Attending: ORTHOPAEDIC SURGERY
Payer: COMMERCIAL

## 2021-06-08 VITALS — OXYGEN SATURATION: 96 % | DIASTOLIC BLOOD PRESSURE: 62 MMHG | SYSTOLIC BLOOD PRESSURE: 117 MMHG

## 2021-06-08 DIAGNOSIS — Z96.651 STATUS POST REVISION OF TOTAL KNEE REPLACEMENT, RIGHT: Primary | ICD-10-CM

## 2021-06-08 PROCEDURE — 6360000002 HC RX W HCPCS: Performed by: ORTHOPAEDIC SURGERY

## 2021-06-08 PROCEDURE — 6360000002 HC RX W HCPCS: Performed by: ANESTHESIOLOGY

## 2021-06-08 PROCEDURE — 6370000000 HC RX 637 (ALT 250 FOR IP): Performed by: ORTHOPAEDIC SURGERY

## 2021-06-08 PROCEDURE — C1776 JOINT DEVICE (IMPLANTABLE): HCPCS | Performed by: ORTHOPAEDIC SURGERY

## 2021-06-08 PROCEDURE — 2500000003 HC RX 250 WO HCPCS: Performed by: NURSE ANESTHETIST, CERTIFIED REGISTERED

## 2021-06-08 PROCEDURE — 2709999900 HC NON-CHARGEABLE SUPPLY: Performed by: ORTHOPAEDIC SURGERY

## 2021-06-08 PROCEDURE — 2580000003 HC RX 258: Performed by: ORTHOPAEDIC SURGERY

## 2021-06-08 PROCEDURE — 27486 REVISE/REPLACE KNEE JOINT: CPT | Performed by: ORTHOPAEDIC SURGERY

## 2021-06-08 PROCEDURE — 3700000000 HC ANESTHESIA ATTENDED CARE: Performed by: ORTHOPAEDIC SURGERY

## 2021-06-08 PROCEDURE — 7100000000 HC PACU RECOVERY - FIRST 15 MIN: Performed by: ORTHOPAEDIC SURGERY

## 2021-06-08 PROCEDURE — G0378 HOSPITAL OBSERVATION PER HR: HCPCS

## 2021-06-08 PROCEDURE — 3700000001 HC ADD 15 MINUTES (ANESTHESIA): Performed by: ORTHOPAEDIC SURGERY

## 2021-06-08 PROCEDURE — 6360000002 HC RX W HCPCS: Performed by: NURSE ANESTHETIST, CERTIFIED REGISTERED

## 2021-06-08 PROCEDURE — C9290 INJ, BUPIVACAINE LIPOSOME: HCPCS | Performed by: ANESTHESIOLOGY

## 2021-06-08 PROCEDURE — 2580000003 HC RX 258: Performed by: ANESTHESIOLOGY

## 2021-06-08 PROCEDURE — 7100000001 HC PACU RECOVERY - ADDTL 15 MIN: Performed by: ORTHOPAEDIC SURGERY

## 2021-06-08 PROCEDURE — 64447 NJX AA&/STRD FEMORAL NRV IMG: CPT | Performed by: ANESTHESIOLOGY

## 2021-06-08 PROCEDURE — 2580000003 HC RX 258: Performed by: STUDENT IN AN ORGANIZED HEALTH CARE EDUCATION/TRAINING PROGRAM

## 2021-06-08 PROCEDURE — 3600000004 HC SURGERY LEVEL 4 BASE: Performed by: ORTHOPAEDIC SURGERY

## 2021-06-08 PROCEDURE — 6370000000 HC RX 637 (ALT 250 FOR IP): Performed by: STUDENT IN AN ORGANIZED HEALTH CARE EDUCATION/TRAINING PROGRAM

## 2021-06-08 PROCEDURE — 3600000014 HC SURGERY LEVEL 4 ADDTL 15MIN: Performed by: ORTHOPAEDIC SURGERY

## 2021-06-08 PROCEDURE — C1713 ANCHOR/SCREW BN/BN,TIS/BN: HCPCS | Performed by: ORTHOPAEDIC SURGERY

## 2021-06-08 PROCEDURE — 2500000003 HC RX 250 WO HCPCS: Performed by: ORTHOPAEDIC SURGERY

## 2021-06-08 PROCEDURE — 73562 X-RAY EXAM OF KNEE 3: CPT

## 2021-06-08 PROCEDURE — 64999 UNLISTED PX NERVOUS SYSTEM: CPT | Performed by: ANESTHESIOLOGY

## 2021-06-08 DEVICE — COMPONENT PAT DIA35MM THK9MM STD KNEE VIVACIT-E CEM PERSONA: Type: IMPLANTABLE DEVICE | Site: KNEE | Status: FUNCTIONAL

## 2021-06-08 DEVICE — CEMENT BONE 40GM HI VISC PALACOS R: Type: IMPLANTABLE DEVICE | Site: KNEE | Status: FUNCTIONAL

## 2021-06-08 RX ORDER — CYCLOBENZAPRINE HCL 10 MG
10 TABLET ORAL 3 TIMES DAILY PRN
Status: DISCONTINUED | OUTPATIENT
Start: 2021-06-08 | End: 2021-06-09 | Stop reason: HOSPADM

## 2021-06-08 RX ORDER — PHENYLEPHRINE HYDROCHLORIDE 10 MG/ML
INJECTION INTRAVENOUS PRN
Status: DISCONTINUED | OUTPATIENT
Start: 2021-06-08 | End: 2021-06-08 | Stop reason: SDUPTHER

## 2021-06-08 RX ORDER — TRANEXAMIC ACID 10 MG/ML
INJECTION, SOLUTION INTRAVENOUS PRN
Status: DISCONTINUED | OUTPATIENT
Start: 2021-06-08 | End: 2021-06-08 | Stop reason: SDUPTHER

## 2021-06-08 RX ORDER — CLINDAMYCIN PHOSPHATE 900 MG/50ML
900 INJECTION INTRAVENOUS
Status: COMPLETED | OUTPATIENT
Start: 2021-06-08 | End: 2021-06-08

## 2021-06-08 RX ORDER — MAGNESIUM HYDROXIDE 1200 MG/15ML
LIQUID ORAL CONTINUOUS PRN
Status: COMPLETED | OUTPATIENT
Start: 2021-06-08 | End: 2021-06-08

## 2021-06-08 RX ORDER — ASPIRIN 81 MG/1
81 TABLET ORAL 2 TIMES DAILY
Status: DISCONTINUED | OUTPATIENT
Start: 2021-06-08 | End: 2021-06-09 | Stop reason: HOSPADM

## 2021-06-08 RX ORDER — ONDANSETRON 2 MG/ML
4 INJECTION INTRAMUSCULAR; INTRAVENOUS EVERY 6 HOURS PRN
Status: DISCONTINUED | OUTPATIENT
Start: 2021-06-08 | End: 2021-06-09 | Stop reason: HOSPADM

## 2021-06-08 RX ORDER — GABAPENTIN 800 MG/1
800 TABLET ORAL ONCE
Status: COMPLETED | OUTPATIENT
Start: 2021-06-08 | End: 2021-06-08

## 2021-06-08 RX ORDER — VANCOMYCIN HYDROCHLORIDE 1 G/20ML
INJECTION, POWDER, LYOPHILIZED, FOR SOLUTION INTRAVENOUS PRN
Status: DISCONTINUED | OUTPATIENT
Start: 2021-06-08 | End: 2021-06-08 | Stop reason: HOSPADM

## 2021-06-08 RX ORDER — CELECOXIB 100 MG/1
200 CAPSULE ORAL ONCE
Status: COMPLETED | OUTPATIENT
Start: 2021-06-08 | End: 2021-06-08

## 2021-06-08 RX ORDER — POLYETHYLENE GLYCOL 3350 17 G/17G
17 POWDER, FOR SOLUTION ORAL DAILY PRN
Status: DISCONTINUED | OUTPATIENT
Start: 2021-06-08 | End: 2021-06-09 | Stop reason: HOSPADM

## 2021-06-08 RX ORDER — LIDOCAINE HYDROCHLORIDE 10 MG/ML
1 INJECTION, SOLUTION EPIDURAL; INFILTRATION; INTRACAUDAL; PERINEURAL
Status: DISCONTINUED | OUTPATIENT
Start: 2021-06-08 | End: 2021-06-08

## 2021-06-08 RX ORDER — SODIUM CHLORIDE, SODIUM LACTATE, POTASSIUM CHLORIDE, CALCIUM CHLORIDE 600; 310; 30; 20 MG/100ML; MG/100ML; MG/100ML; MG/100ML
INJECTION, SOLUTION INTRAVENOUS CONTINUOUS
Status: DISCONTINUED | OUTPATIENT
Start: 2021-06-08 | End: 2021-06-08 | Stop reason: SDUPTHER

## 2021-06-08 RX ORDER — KETOROLAC TROMETHAMINE 30 MG/ML
30 INJECTION, SOLUTION INTRAMUSCULAR; INTRAVENOUS EVERY 6 HOURS PRN
Status: DISCONTINUED | OUTPATIENT
Start: 2021-06-08 | End: 2021-06-09 | Stop reason: HOSPADM

## 2021-06-08 RX ORDER — SODIUM CHLORIDE 9 MG/ML
25 INJECTION, SOLUTION INTRAVENOUS PRN
Status: DISCONTINUED | OUTPATIENT
Start: 2021-06-08 | End: 2021-06-09 | Stop reason: HOSPADM

## 2021-06-08 RX ORDER — ZOLPIDEM TARTRATE 5 MG/1
10 TABLET ORAL NIGHTLY PRN
Status: DISCONTINUED | OUTPATIENT
Start: 2021-06-08 | End: 2021-06-09 | Stop reason: HOSPADM

## 2021-06-08 RX ORDER — LIDOCAINE HYDROCHLORIDE 10 MG/ML
INJECTION, SOLUTION EPIDURAL; INFILTRATION; INTRACAUDAL; PERINEURAL PRN
Status: DISCONTINUED | OUTPATIENT
Start: 2021-06-08 | End: 2021-06-08 | Stop reason: SDUPTHER

## 2021-06-08 RX ORDER — PROPOFOL 10 MG/ML
INJECTION, EMULSION INTRAVENOUS CONTINUOUS PRN
Status: DISCONTINUED | OUTPATIENT
Start: 2021-06-08 | End: 2021-06-08 | Stop reason: SDUPTHER

## 2021-06-08 RX ORDER — ONDANSETRON 2 MG/ML
INJECTION INTRAMUSCULAR; INTRAVENOUS PRN
Status: DISCONTINUED | OUTPATIENT
Start: 2021-06-08 | End: 2021-06-08 | Stop reason: SDUPTHER

## 2021-06-08 RX ORDER — SODIUM CHLORIDE 0.9 % (FLUSH) 0.9 %
5-40 SYRINGE (ML) INJECTION EVERY 12 HOURS SCHEDULED
Status: DISCONTINUED | OUTPATIENT
Start: 2021-06-08 | End: 2021-06-09 | Stop reason: HOSPADM

## 2021-06-08 RX ORDER — SODIUM CHLORIDE, SODIUM LACTATE, POTASSIUM CHLORIDE, CALCIUM CHLORIDE 600; 310; 30; 20 MG/100ML; MG/100ML; MG/100ML; MG/100ML
1000 INJECTION, SOLUTION INTRAVENOUS CONTINUOUS
Status: DISCONTINUED | OUTPATIENT
Start: 2021-06-08 | End: 2021-06-08

## 2021-06-08 RX ORDER — ACETAMINOPHEN 325 MG/1
650 TABLET ORAL ONCE
Status: COMPLETED | OUTPATIENT
Start: 2021-06-08 | End: 2021-06-08

## 2021-06-08 RX ORDER — TRAMADOL HYDROCHLORIDE 50 MG/1
50 TABLET ORAL ONCE
Status: COMPLETED | OUTPATIENT
Start: 2021-06-08 | End: 2021-06-08

## 2021-06-08 RX ORDER — SODIUM CHLORIDE 0.9 % (FLUSH) 0.9 %
5-40 SYRINGE (ML) INJECTION PRN
Status: DISCONTINUED | OUTPATIENT
Start: 2021-06-08 | End: 2021-06-09 | Stop reason: HOSPADM

## 2021-06-08 RX ORDER — ACETAMINOPHEN 325 MG/1
650 TABLET ORAL EVERY 6 HOURS
Status: DISCONTINUED | OUTPATIENT
Start: 2021-06-08 | End: 2021-06-09 | Stop reason: HOSPADM

## 2021-06-08 RX ORDER — ONDANSETRON 4 MG/1
4 TABLET, ORALLY DISINTEGRATING ORAL EVERY 8 HOURS PRN
Status: DISCONTINUED | OUTPATIENT
Start: 2021-06-08 | End: 2021-06-09 | Stop reason: HOSPADM

## 2021-06-08 RX ORDER — SODIUM CHLORIDE 0.9 % (FLUSH) 0.9 %
5-40 SYRINGE (ML) INJECTION EVERY 12 HOURS SCHEDULED
Status: DISCONTINUED | OUTPATIENT
Start: 2021-06-08 | End: 2021-06-08

## 2021-06-08 RX ORDER — FENTANYL CITRATE 50 UG/ML
50 INJECTION, SOLUTION INTRAMUSCULAR; INTRAVENOUS ONCE
Status: COMPLETED | OUTPATIENT
Start: 2021-06-08 | End: 2021-06-08

## 2021-06-08 RX ORDER — OXYCODONE HYDROCHLORIDE AND ACETAMINOPHEN 5; 325 MG/1; MG/1
1-2 TABLET ORAL EVERY 4 HOURS PRN
Qty: 42 TABLET | Refills: 0 | Status: SHIPPED | OUTPATIENT
Start: 2021-06-08 | End: 2021-06-15

## 2021-06-08 RX ORDER — SODIUM CHLORIDE 9 MG/ML
INJECTION, SOLUTION INTRAVENOUS CONTINUOUS
Status: DISCONTINUED | OUTPATIENT
Start: 2021-06-08 | End: 2021-06-09 | Stop reason: HOSPADM

## 2021-06-08 RX ORDER — DIPHENHYDRAMINE HYDROCHLORIDE 50 MG/ML
INJECTION INTRAMUSCULAR; INTRAVENOUS PRN
Status: DISCONTINUED | OUTPATIENT
Start: 2021-06-08 | End: 2021-06-08 | Stop reason: SDUPTHER

## 2021-06-08 RX ORDER — FENTANYL CITRATE 50 UG/ML
25 INJECTION, SOLUTION INTRAMUSCULAR; INTRAVENOUS EVERY 5 MIN PRN
Status: DISCONTINUED | OUTPATIENT
Start: 2021-06-08 | End: 2021-06-08

## 2021-06-08 RX ORDER — MIDAZOLAM HYDROCHLORIDE 2 MG/2ML
1 INJECTION, SOLUTION INTRAMUSCULAR; INTRAVENOUS ONCE
Status: COMPLETED | OUTPATIENT
Start: 2021-06-08 | End: 2021-06-08

## 2021-06-08 RX ORDER — DEXAMETHASONE SODIUM PHOSPHATE 10 MG/ML
10 INJECTION INTRAMUSCULAR; INTRAVENOUS ONCE
Status: COMPLETED | OUTPATIENT
Start: 2021-06-08 | End: 2021-06-08

## 2021-06-08 RX ORDER — SODIUM CHLORIDE 9 MG/ML
25 INJECTION, SOLUTION INTRAVENOUS PRN
Status: DISCONTINUED | OUTPATIENT
Start: 2021-06-08 | End: 2021-06-08

## 2021-06-08 RX ORDER — FENTANYL CITRATE 50 UG/ML
INJECTION, SOLUTION INTRAMUSCULAR; INTRAVENOUS PRN
Status: DISCONTINUED | OUTPATIENT
Start: 2021-06-08 | End: 2021-06-08 | Stop reason: SDUPTHER

## 2021-06-08 RX ORDER — ASPIRIN 81 MG/1
81 TABLET ORAL 2 TIMES DAILY
Qty: 84 TABLET | Refills: 0 | Status: SHIPPED | OUTPATIENT
Start: 2021-06-08 | End: 2021-07-20

## 2021-06-08 RX ORDER — TRAMADOL HYDROCHLORIDE 50 MG/1
50 TABLET ORAL EVERY 6 HOURS PRN
Status: DISCONTINUED | OUTPATIENT
Start: 2021-06-08 | End: 2021-06-09 | Stop reason: HOSPADM

## 2021-06-08 RX ORDER — SENNA PLUS 8.6 MG/1
1 TABLET ORAL DAILY PRN
Status: DISCONTINUED | OUTPATIENT
Start: 2021-06-08 | End: 2021-06-09 | Stop reason: HOSPADM

## 2021-06-08 RX ORDER — OXYCODONE HYDROCHLORIDE 5 MG/1
5 TABLET ORAL EVERY 4 HOURS PRN
Status: DISCONTINUED | OUTPATIENT
Start: 2021-06-08 | End: 2021-06-09 | Stop reason: HOSPADM

## 2021-06-08 RX ORDER — SODIUM CHLORIDE 0.9 % (FLUSH) 0.9 %
5-40 SYRINGE (ML) INJECTION PRN
Status: DISCONTINUED | OUTPATIENT
Start: 2021-06-08 | End: 2021-06-08

## 2021-06-08 RX ORDER — OXYCODONE HYDROCHLORIDE 5 MG/1
10 TABLET ORAL EVERY 4 HOURS PRN
Status: DISCONTINUED | OUTPATIENT
Start: 2021-06-08 | End: 2021-06-09 | Stop reason: HOSPADM

## 2021-06-08 RX ORDER — MIDAZOLAM HYDROCHLORIDE 2 MG/2ML
1 INJECTION, SOLUTION INTRAMUSCULAR; INTRAVENOUS EVERY 10 MIN PRN
Status: DISCONTINUED | OUTPATIENT
Start: 2021-06-08 | End: 2021-06-08

## 2021-06-08 RX ADMIN — SODIUM CHLORIDE, POTASSIUM CHLORIDE, SODIUM LACTATE AND CALCIUM CHLORIDE 1000 ML: 600; 310; 30; 20 INJECTION, SOLUTION INTRAVENOUS at 14:00

## 2021-06-08 RX ADMIN — TRAMADOL HYDROCHLORIDE 50 MG: 50 TABLET, FILM COATED ORAL at 13:40

## 2021-06-08 RX ADMIN — LIDOCAINE HYDROCHLORIDE 50 MG: 10 INJECTION, SOLUTION EPIDURAL; INFILTRATION; INTRACAUDAL; PERINEURAL at 14:27

## 2021-06-08 RX ADMIN — SODIUM CHLORIDE, POTASSIUM CHLORIDE, SODIUM LACTATE AND CALCIUM CHLORIDE: 600; 310; 30; 20 INJECTION, SOLUTION INTRAVENOUS at 15:20

## 2021-06-08 RX ADMIN — PHENYLEPHRINE HYDROCHLORIDE 100 MCG: 10 INJECTION INTRAVENOUS at 15:50

## 2021-06-08 RX ADMIN — BUPIVACAINE 10 ML: 13.3 INJECTION, SUSPENSION, LIPOSOMAL INFILTRATION at 14:21

## 2021-06-08 RX ADMIN — FENTANYL CITRATE 50 MCG: 50 INJECTION, SOLUTION INTRAMUSCULAR; INTRAVENOUS at 14:27

## 2021-06-08 RX ADMIN — ACETAMINOPHEN 650 MG: 325 TABLET ORAL at 13:40

## 2021-06-08 RX ADMIN — PHENYLEPHRINE HYDROCHLORIDE 200 MCG: 10 INJECTION INTRAVENOUS at 14:40

## 2021-06-08 RX ADMIN — PHENYLEPHRINE HYDROCHLORIDE 200 MCG: 10 INJECTION INTRAVENOUS at 15:19

## 2021-06-08 RX ADMIN — PHENYLEPHRINE HYDROCHLORIDE 200 MCG: 10 INJECTION INTRAVENOUS at 15:08

## 2021-06-08 RX ADMIN — PHENYLEPHRINE HYDROCHLORIDE 200 MCG: 10 INJECTION INTRAVENOUS at 14:35

## 2021-06-08 RX ADMIN — GABAPENTIN 800 MG: 800 TABLET ORAL at 13:40

## 2021-06-08 RX ADMIN — PHENYLEPHRINE HYDROCHLORIDE 200 MCG: 10 INJECTION INTRAVENOUS at 14:51

## 2021-06-08 RX ADMIN — PROPOFOL 125 MCG/KG/MIN: 10 INJECTION, EMULSION INTRAVENOUS at 14:27

## 2021-06-08 RX ADMIN — DEXAMETHASONE SODIUM PHOSPHATE 10 MG: 10 INJECTION INTRAMUSCULAR; INTRAVENOUS at 14:08

## 2021-06-08 RX ADMIN — CELECOXIB 200 MG: 100 CAPSULE ORAL at 13:42

## 2021-06-08 RX ADMIN — FENTANYL CITRATE 25 MCG: 50 INJECTION, SOLUTION INTRAMUSCULAR; INTRAVENOUS at 15:29

## 2021-06-08 RX ADMIN — TRANEXAMIC ACID 1000 MG: 10 INJECTION, SOLUTION INTRAVENOUS at 14:35

## 2021-06-08 RX ADMIN — CLINDAMYCIN IN 5 PERCENT DEXTROSE 900 MG: 18 INJECTION, SOLUTION INTRAVENOUS at 14:30

## 2021-06-08 RX ADMIN — FENTANYL CITRATE 25 MCG: 50 INJECTION, SOLUTION INTRAMUSCULAR; INTRAVENOUS at 15:43

## 2021-06-08 RX ADMIN — PHENYLEPHRINE HYDROCHLORIDE 200 MCG: 10 INJECTION INTRAVENOUS at 15:28

## 2021-06-08 RX ADMIN — SODIUM CHLORIDE: 9 INJECTION, SOLUTION INTRAVENOUS at 18:13

## 2021-06-08 RX ADMIN — BUPIVACAINE 133 MG: 13.3 INJECTION, SUSPENSION, LIPOSOMAL INFILTRATION at 13:59

## 2021-06-08 RX ADMIN — Medication 12.5 MG: at 14:30

## 2021-06-08 RX ADMIN — TRANEXAMIC ACID 1000 MG: 10 INJECTION, SOLUTION INTRAVENOUS at 15:30

## 2021-06-08 RX ADMIN — ACETAMINOPHEN 650 MG: 325 TABLET ORAL at 21:00

## 2021-06-08 RX ADMIN — PHENYLEPHRINE HYDROCHLORIDE 200 MCG: 10 INJECTION INTRAVENOUS at 14:45

## 2021-06-08 RX ADMIN — MIDAZOLAM HYDROCHLORIDE 2 MG: 1 INJECTION, SOLUTION INTRAMUSCULAR; INTRAVENOUS at 13:59

## 2021-06-08 RX ADMIN — FENTANYL CITRATE 50 MCG: 50 INJECTION, SOLUTION INTRAMUSCULAR; INTRAVENOUS at 13:59

## 2021-06-08 RX ADMIN — ONDANSETRON 4 MG: 2 INJECTION INTRAMUSCULAR; INTRAVENOUS at 15:45

## 2021-06-08 RX ADMIN — PHENYLEPHRINE HYDROCHLORIDE 200 MCG: 10 INJECTION INTRAVENOUS at 15:00

## 2021-06-08 RX ADMIN — Medication 81 MG: at 21:00

## 2021-06-08 ASSESSMENT — PULMONARY FUNCTION TESTS
PIF_VALUE: 0
PIF_VALUE: 1
PIF_VALUE: 0
PIF_VALUE: 1
PIF_VALUE: 0

## 2021-06-08 ASSESSMENT — PAIN SCALES - GENERAL
PAINLEVEL_OUTOF10: 0
PAINLEVEL_OUTOF10: 2

## 2021-06-08 ASSESSMENT — PAIN DESCRIPTION - PROGRESSION: CLINICAL_PROGRESSION: NOT CHANGED

## 2021-06-08 ASSESSMENT — PAIN DESCRIPTION - ONSET: ONSET: ON-GOING

## 2021-06-08 ASSESSMENT — PAIN DESCRIPTION - PAIN TYPE: TYPE: ACUTE PAIN;SURGICAL PAIN

## 2021-06-08 ASSESSMENT — PAIN DESCRIPTION - LOCATION: LOCATION: KNEE

## 2021-06-08 ASSESSMENT — PAIN DESCRIPTION - FREQUENCY: FREQUENCY: CONTINUOUS

## 2021-06-08 ASSESSMENT — PAIN DESCRIPTION - DESCRIPTORS: DESCRIPTORS: ACHING;DISCOMFORT

## 2021-06-08 ASSESSMENT — PAIN DESCRIPTION - ORIENTATION: ORIENTATION: RIGHT

## 2021-06-08 NOTE — INTERVAL H&P NOTE
Pt Name: Hayley Chew  MRN: [id number]  YOB: 1960  Date of evaluation: 6/8/2021    I have reviewed the patient's history and physical examination completed in pre-admission testing on 5/26/21. No changes to history or on examination today, unless noted below. Medical clearance obtained. No new changes.     Kandy Wilder, JENN - CNP  6/8/21  1:54 PM

## 2021-06-08 NOTE — BRIEF OP NOTE
Brief Postoperative Note      Patient: Lou Castro  YOB: 1960  MRN: [id number]    Date of Procedure: 6/8/2021    Pre-Op Diagnosis: RIGHT PATELLA PAIN    Post-Op Diagnosis: Right patellar implant pain/maltracking       Procedure: Revision arthroplasty patellar component right TKA    Surgeon(s):  Babar Merlos DO    Assistant:  Resident: Andrae Phillips DO; Talia Kaminski DO    Anesthesia: General    Estimated Blood Loss (mL): 50    IVF: 3227KH    Complications: None    Specimens:   * No specimens in log *    Implants:  Ananda persona 35mm patella    Implant Name Type Inv.  Item Serial No.  Lot No. LRB No. Used Action   CEMENT BONE 40GM HI VISC PALACOS R  CEMENT BONE 40GM HI VISC [facility]- [id number] Right 1 Implanted   COMPONENT PAT [id number] STD KNEE VIVACIT-E YANI PERSONA  COMPONENT PAT [id number] THK9MM STD KNEE VIVACIT-E YANI PERSONA  ANANDA [facility]-WD [id number] Right 1 Implanted   PATELLA       Right 1 Explanted         Drains: * No LDAs found *    Findings: see op note    Electronically signed by Andrae Phillips DO on 6/8/2021 at 3:56 PM

## 2021-06-08 NOTE — ANESTHESIA PROCEDURE NOTES
Peripheral Block    Patient location during procedure: pre-op  Start time: 6/8/2021 1:59 PM  End time: 6/8/2021 2:05 PM  Staffing  Performed: anesthesiologist   Anesthesiologist: Mary Nye MD  Preanesthetic Checklist  Completed: patient identified, IV checked, site marked, risks and benefits discussed, surgical consent, monitors and equipment checked, pre-op evaluation, timeout performed, anesthesia consent given, oxygen available and patient being monitored  Peripheral Block  Patient position: supine  Prep: ChloraPrep  Patient monitoring: cardiac monitor, continuous pulse ox, frequent blood pressure checks and IV access  Block type: iPacks  Laterality: right  Injection technique: single-shot  Guidance: ultrasound guided  Local infiltration: lidocaine  Infiltration strength: 1 %  Dose: 3 mL  Provider prep: mask and sterile gloves  Local infiltration: lidocaine  Needle  Needle type: short-bevel   Needle gauge: 21 G  Needle length: 10 cm  Needle localization: ultrasound guidance  Needle insertion depth: 6 cm  Test dose: negative  Assessment  Injection assessment: negative aspiration for heme, no paresthesia on injection and local visualized surrounding nerve on ultrasound  Paresthesia pain: none  Slow fractionated injection: yes  Hemodynamics: stable  Additional Notes  U/S 54549.  (1) Under ultrasound guidance, a 21` gauge needle was inserted and placed in close proximity to the  nerve.  (2) Ultrasound was also used to visualize the spread of the anesthetic in close proximity to the nerve being blocked. (3) The nerve appeared anatomically normal, and (4 there were no apparent abnormal pathological findings on the image that were readily visible and related to the nerve being blocked. (5) A permanent ultrasound image was saved in the patient's record.  15 ml of .125% marcaine        Reason for block: post-op pain management and at surgeon's request

## 2021-06-08 NOTE — PROGRESS NOTES
PROTOCOLS  NURSING IMPLEMENTED    TOTAL JOINT DVT/PE  VENOUS THROMBOEMBOLISM PROPHYLAXIS  (Nursing Automatically Implement)    Mila Sosa  [id number]  [unfilled]  6/8/21    YES DVT RISK FACTOR SCORE YES MAJOR BLEEDING RISK FACTORS SCORE     [x] 48years old or greater (1)   [] Hx. Easy Bleeding (1)      [] Heart failure (2)   [] NSAID Use in Last 5 Days (2)      [] Varicose veins - Hx. (1)   [] Gastrointestinal or Genitourinary bleeding in Last 14 Days (2)      [] Myocardial Infarction - Hx. (1)         [] Cancer - Hx. (2)         [] Atrial fibrillation - Hx. (1)         [] Ischemic Stroke - Hx. (1)         [] Diabetes Mellitus - Hx. (1)         [] Previous DVT/PE - Hx.  (2)         [] Hormone Replacement Therapy (1)         [x] Obesity (1)         [] Paralysis (1)         [] Pregnancy (1)         [] Smoking (1)                   [] Thromophilia (1)   []   Mild to Moderate Bleeding (2)      [] Total Hip Arthroplasty (1)   [] Active Bleeding (4)      [] Family history of PE or DVT? (4) (Consider the following labs to test for presence of inhibitor deficiency state:) Factor V Leiden, Prothrombin Gene Mutation, Protein S Deficiency, Protien C Deficiency, Antithrombin Deficiency   [] Malignant Hypertension (2)        [] Thrombocytopenia 20k to 100k (2)        [] Thrombocytopenia less than 20k (4)        [] Bleeding Diathesis (4)        [] \"Bloody Stick\" Epidural or Spinal (2)     TOTAL DVT SCORE   TOTAL BLEEDING SCORE      [] CLASS A   Standard Risk DVT (0-3)    [] CLASS X Standard Risk Bleeding (0-4)      [] CLASS B Elevated Risk DVT (greater than 3)    [] CLASS Y High Risk Bleeding (greater than 4)     FINAL MATRIX (e.g. AY)       *If allergic to ASA use Warfarin  *BY patient consider no treatment  **Consider venous filter with high risk PE  **If on Coumadin pre-op, then restart night of surgery      [x]  DVT Prophylaxis: Class AX, AY    Ecotrin 81 mg by mouth BID starting day of surgery for 6 weeks for all total  joints. (If allergic to aspirin, give eliquis 2.5mg BID X 14 days (knees) or 35 days  (hips) starting first day postop at 0600). []  DVT Prophylaxis:  Class BX (lobo choice)    []Eliquis 2.5mg BID x 14 days (knees) or 35 days (hips) starting first day postop      at 0600      [] Lovenox 40 mg subcu daily starting first day postop at 0600 x 14 days                             (knees) or 35 days (hips)    Ecotrin 81 mg PO BID-start when Lovenox is finished. (Teach injection prior to discharge.)       [] Xarelto 10 mg by mouth daily starting first day postop at 0600     14 days (knees) or 35 days (hips). If creatinine clearance less than 30 mL/min, give Lovenox 30 mg subcu   Daily starting first day postop at 0600. Ecotrin 81 mg PO BID-starting   when Lovenox is finished. (Teach injection prior to      discharge.)  (For discharge fill throughout the 10 mg daily with no    refills.)      []  DVT Prophylaxis:  Class BY (lobo choice)               []  Eliquis 2.5mg BID x 14 days (knees) or 35 days (hips) starting first day                              postop at 0600        []  Ecotrin 81 mg PO BID x 6weeks (all joints)      []  Lovenox 40mg SQ daily to start at 0600 first day post-Op day. 14 days for knees, 35 days for hips    Ecotrin 81 mg PO BID - start when Lovenox is finished. (Teach injection prior to discharge.)       [] Xarelto 10 mg PO daily starting first day Post-Op at 0600    14 days (knees) or 35 days (hips)    If Creatinine Clearance less than 30ml/min, give Lovenox 30 mg    SQ daily starting first day Post-Op at 0600 x 14 days (knees) or 35   days (hips). Ecotrin 81 mg PO BID - start when Lovenox is finished.     (Teach injection prior to discharge.)  (For discharge fill throughout the   10 mg daily #32 with no refills.)      Electronically signed by Dillon Helm DO on 6/8/2021 at 6:14 AM

## 2021-06-08 NOTE — ANESTHESIA PRE PROCEDURE
Department of Anesthesiology  Preprocedure Note       Name:  Luis Hitchcock   Age:  63 y.o.  :  1960                                          MRN:  [id number]         Date:  2021      Surgeon: Mark Puentes):  Alejandra Officer,     Procedure: Procedure(s):  PATELLAR REVISION ARTHROPLASTY, ANANDA, SINGLE SHOT ADDUCTOR IPAC, SPI VS GENERAL, SUPINE, 3080 TABLE    Medications prior to admission:   Prior to Admission medications    Medication Sig Start Date End Date Taking? Authorizing Provider   Hyoscyamine Sulfate (LEVSIN/SL SL) Place under the tongue as needed    Historical Provider, MD   Polyethylene Glycol 3350 (MIRALAX PO) Take by mouth every other day    Historical Provider, MD   Probiotic Product (PROBIOTIC DAILY PO) Take by mouth Daily    Historical Provider, MD   BIOTIN PO Take by mouth Daily    Historical Provider, MD   Methylcellulose, Laxative, (CITRUCEL PO) Take by mouth nightly    Historical Provider, MD   ibuprofen (ADVIL) 200 MG CAPS Take 1 capsule by mouth nightly    Historical Provider, MD   ondansetron (ZOFRAN-ODT) 4 MG disintegrating tablet dissolve 1 tablet ON TONGUE every 8 hours if needed for nausea OR vomiting 3/8/21   Historical Provider, MD   amoxicillin (AMOXIL) 500 MG capsule Take 2 caps 1 hour prior to appointment, then 1 cap twice a day until gone. 21   TRACI Horner   famotidine (PEPCID) 20 MG tablet Take 20 mg by mouth 2 times daily    Historical Provider, MD   zolpidem (AMBIEN) 10 MG tablet Take by mouth nightly as needed for Sleep. Historical Provider, MD   cyclobenzaprine (FLEXERIL) 10 MG tablet Take 10 mg by mouth 3 times daily as needed for Muscle spasms    Historical Provider, MD       Current medications:    No current facility-administered medications for this encounter.      Current Outpatient Medications   Medication Sig Dispense Refill    Hyoscyamine Sulfate (LEVSIN/SL SL) Place under the tongue as needed      Polyethylene Glycol 3350 (MIRALAX PO) Take by mouth every other day      Probiotic Product (PROBIOTIC DAILY PO) Take by mouth Daily      BIOTIN PO Take by mouth Daily      Methylcellulose, Laxative, (CITRUCEL PO) Take by mouth nightly      ibuprofen (ADVIL) 200 MG CAPS Take 1 capsule by mouth nightly      ondansetron (ZOFRAN-ODT) 4 MG disintegrating tablet dissolve 1 tablet ON TONGUE every 8 hours if needed for nausea OR vomiting      amoxicillin (AMOXIL) 500 MG capsule Take 2 caps 1 hour prior to appointment, then 1 cap twice a day until gone. 6 capsule 0    famotidine (PEPCID) 20 MG tablet Take 20 mg by mouth 2 times daily      zolpidem (AMBIEN) 10 MG tablet Take by mouth nightly as needed for Sleep.  cyclobenzaprine (FLEXERIL) 10 MG tablet Take 10 mg by mouth 3 times daily as needed for Muscle spasms         Allergies: Allergies   Allergen Reactions    Ceclor [Cefaclor] Hives and Swelling    Gluten Meal Other (See Comments)     bloating       Problem List:  There is no problem list on file for this patient. Past Medical History:        Diagnosis Date    Arthritis     Diverticulitis     Diverticulosis     Fibrocystic breast     Fibromyalgia     Health care maintenance     PCP:  Mariah Sanz CNP.   next visit 6/2/21    IBS (irritable bowel syndrome)     Patellar instability of right knee     PONV (postoperative nausea and vomiting)     Sleep apnea     very mild, no machine       Past Surgical History:        Procedure Laterality Date    BREAST LUMPECTOMY Bilateral     as a teen, fibroids    CHOLECYSTECTOMY      COLONOSCOPY      ENDOSCOPY, COLON, DIAGNOSTIC      LASIK Bilateral     TOTAL KNEE ARTHROPLASTY Bilateral     12/2015- Dr Sridevi Peck Right        Social History:    Social History     Tobacco Use    Smoking status: Never Smoker    Smokeless tobacco: Never Used   Substance Use Topics    Alcohol use: Yes     Comment: glass of wine daily                                Counseling given: Not Answered      Vital Signs (Current): There were no vitals filed for this visit. BP Readings from Last 3 Encounters:   05/26/21 129/76       NPO Status: Time of last liquid consumption: 2200                        Time of last solid consumption: 2100                                                      BMI:   Wt Readings from Last 3 Encounters:   05/26/21 164 lb (74.4 kg)   03/29/21 166 lb (75.3 kg)   02/06/20 166 lb 0.1 oz (75.3 kg)     There is no height or weight on file to calculate BMI.    CBC:   Lab Results   Component Value Date    WBC 7.0 05/26/2021    RBC 4.60 05/26/2021    HGB 14.0 05/26/2021    HCT 43.7 05/26/2021    MCV 95.0 05/26/2021    RDW 13.0 05/26/2021     05/26/2021       CMP:   Lab Results   Component Value Date     05/26/2021    K 4.5 05/26/2021     05/26/2021    CO2 28 05/26/2021    BUN 10 05/26/2021    CREATININE 0.58 05/26/2021    GFRAA >60 05/26/2021    LABGLOM >60 05/26/2021    GLUCOSE 95 05/26/2021    PROT 8.1 05/26/2021    CALCIUM 9.5 05/26/2021    BILITOT 0.21 05/26/2021    ALKPHOS 96 05/26/2021    AST 32 05/26/2021    ALT 22 05/26/2021       POC Tests: No results for input(s): POCGLU, POCNA, POCK, POCCL, POCBUN, POCHEMO, POCHCT in the last 72 hours. Coags: No results found for: PROTIME, INR, APTT    HCG (If Applicable): No results found for: PREGTESTUR, PREGSERUM, HCG, HCGQUANT     ABGs: No results found for: PHART, PO2ART, FTU1LOW, CMF9GCL, BEART, Z0ZFOUVT     Type & Screen (If Applicable):  No results found for: LABABO, LABRH    Drug/Infectious Status (If Applicable):  No results found for: HIV, HEPCAB    COVID-19 Screening (If Applicable): No results found for: COVID19        Anesthesia Evaluation     history of anesthetic complications: PONV.   Airway: Mallampati: II  TM distance: >3 FB   Neck ROM: full  Mouth opening: > = 3 FB Dental:          Pulmonary:normal exam    (+) sleep apnea: on CPAP, Cardiovascular:Negative CV ROS            Rhythm: regular  Rate: normal                    Neuro/Psych:   (+) neuromuscular disease:,             GI/Hepatic/Renal: Neg GI/Hepatic/Renal ROS            Endo/Other: Negative Endo/Other ROS                    Abdominal:           Vascular:                                        Anesthesia Plan      spinal and regional     ASA 3       Induction: intravenous. Anesthetic plan and risks discussed with patient. Use of blood products discussed with patient whom consented to blood products. Plan discussed with CRNA.                   Alma Prabhakar MD   6/8/2021

## 2021-06-08 NOTE — FLOWSHEET NOTE
Dr Tip Palacios to the bedside, time out performed @ 94 31 11, Pt monitored, 02, IPAK  and adductor nerve blocks completed using 0.125% Bupivacaine, 15 mL 0.125% Bupivacaine injected into IPAK site. 25 mL 0.125% Bupivacaine and 10 mL exparel injected into adductor site,  pt tolerated procedure well,  Site CDI, (see charting)  Versed Given: 2 mg  Fentanyl 50 mcg  Start time: 4396  End Time: 1405    Approx 6 mL 1% Lidocaine used as local. 3 mL per site.

## 2021-06-08 NOTE — ANESTHESIA PROCEDURE NOTES
Peripheral Block    Patient location during procedure: pre-op  Start time: 6/8/2021 1:59 PM  End time: 6/8/2021 2:05 PM  Staffing  Performed: anesthesiologist   Anesthesiologist: Heidy Underwood MD  Preanesthetic Checklist  Completed: patient identified, IV checked, site marked, risks and benefits discussed, surgical consent, monitors and equipment checked, pre-op evaluation, timeout performed, anesthesia consent given, oxygen available and patient being monitored  Peripheral Block  Patient position: supine  Prep: ChloraPrep  Patient monitoring: cardiac monitor, continuous pulse ox, frequent blood pressure checks and IV access  Block type: Saphenous  Laterality: right  Injection technique: single-shot  Guidance: ultrasound guided  Local infiltration: lidocaine  Infiltration strength: 1 %  Dose: 3 mL  Provider prep: mask and sterile gloves  Local infiltration: lidocaine  Needle  Needle type: short-bevel   Needle gauge: 21 G  Needle length: 10 cm  Needle localization: ultrasound guidance  Needle insertion depth: 3 cm  Catheter type: open end  Test dose: negative  Assessment  Injection assessment: negative aspiration for heme, no paresthesia on injection and local visualized surrounding nerve on ultrasound  Paresthesia pain: none  Slow fractionated injection: yes  Hemodynamics: stable  Additional Notes  U/S 47726.  (1) Under ultrasound guidance, a 21 gauge needle was inserted and placed in close proximity to the saphenous nerve.  (2) Ultrasound was also used to visualize the spread of the anesthetic in close proximity to the nerve being blocked. (3) The nerve appeared anatomically normal, and (4 there were no apparent abnormal pathological findings on the image that were readily visible and related to the nerve being blocked. (5) A permanent ultrasound image was saved in the patient's record.             Medications Administered  Bupivacaine liposome (EXPAREL) injection 1.3%, 10 mL  Reason for block: post-op pain management and at surgeon's request

## 2021-06-09 VITALS
OXYGEN SATURATION: 94 % | RESPIRATION RATE: 18 BRPM | BODY MASS INDEX: 25.19 KG/M2 | HEART RATE: 68 BPM | WEIGHT: 160.5 LBS | HEIGHT: 67 IN | DIASTOLIC BLOOD PRESSURE: 57 MMHG | TEMPERATURE: 98.1 F | SYSTOLIC BLOOD PRESSURE: 110 MMHG

## 2021-06-09 LAB
ANION GAP SERPL CALCULATED.3IONS-SCNC: 10 MMOL/L (ref 9–17)
BUN BLDV-MCNC: 13 MG/DL (ref 8–23)
BUN/CREAT BLD: ABNORMAL (ref 9–20)
CALCIUM SERPL-MCNC: 8.5 MG/DL (ref 8.6–10.4)
CHLORIDE BLD-SCNC: 105 MMOL/L (ref 98–107)
CO2: 22 MMOL/L (ref 20–31)
CREAT SERPL-MCNC: 0.59 MG/DL (ref 0.5–0.9)
GFR AFRICAN AMERICAN: >60 ML/MIN
GFR NON-AFRICAN AMERICAN: >60 ML/MIN
GFR SERPL CREATININE-BSD FRML MDRD: ABNORMAL ML/MIN/{1.73_M2}
GFR SERPL CREATININE-BSD FRML MDRD: ABNORMAL ML/MIN/{1.73_M2}
GLUCOSE BLD-MCNC: 111 MG/DL (ref 70–99)
HCT VFR BLD CALC: 39.6 % (ref 36.3–47.1)
HEMOGLOBIN: 12.8 G/DL (ref 11.9–15.1)
MCH RBC QN AUTO: 30.5 PG (ref 25.2–33.5)
MCHC RBC AUTO-ENTMCNC: 32.3 G/DL (ref 28.4–34.8)
MCV RBC AUTO: 94.5 FL (ref 82.6–102.9)
NRBC AUTOMATED: 0 PER 100 WBC
PDW BLD-RTO: 12.9 % (ref 11.8–14.4)
PLATELET # BLD: 226 K/UL (ref 138–453)
PMV BLD AUTO: 10.3 FL (ref 8.1–13.5)
POTASSIUM SERPL-SCNC: 4.5 MMOL/L (ref 3.7–5.3)
RBC # BLD: 4.19 M/UL (ref 3.95–5.11)
SODIUM BLD-SCNC: 137 MMOL/L (ref 135–144)
WBC # BLD: 12 K/UL (ref 3.5–11.3)

## 2021-06-09 PROCEDURE — G0378 HOSPITAL OBSERVATION PER HR: HCPCS

## 2021-06-09 PROCEDURE — 97162 PT EVAL MOD COMPLEX 30 MIN: CPT

## 2021-06-09 PROCEDURE — 97530 THERAPEUTIC ACTIVITIES: CPT

## 2021-06-09 PROCEDURE — 36415 COLL VENOUS BLD VENIPUNCTURE: CPT

## 2021-06-09 PROCEDURE — 6370000000 HC RX 637 (ALT 250 FOR IP): Performed by: STUDENT IN AN ORGANIZED HEALTH CARE EDUCATION/TRAINING PROGRAM

## 2021-06-09 PROCEDURE — 80048 BASIC METABOLIC PNL TOTAL CA: CPT

## 2021-06-09 PROCEDURE — 6360000002 HC RX W HCPCS: Performed by: STUDENT IN AN ORGANIZED HEALTH CARE EDUCATION/TRAINING PROGRAM

## 2021-06-09 PROCEDURE — 97166 OT EVAL MOD COMPLEX 45 MIN: CPT

## 2021-06-09 PROCEDURE — 96374 THER/PROPH/DIAG INJ IV PUSH: CPT

## 2021-06-09 PROCEDURE — 85027 COMPLETE CBC AUTOMATED: CPT

## 2021-06-09 PROCEDURE — 97535 SELF CARE MNGMENT TRAINING: CPT

## 2021-06-09 RX ADMIN — ACETAMINOPHEN 650 MG: 325 TABLET ORAL at 02:00

## 2021-06-09 RX ADMIN — Medication 81 MG: at 08:51

## 2021-06-09 RX ADMIN — ACETAMINOPHEN 650 MG: 325 TABLET ORAL at 08:51

## 2021-06-09 RX ADMIN — KETOROLAC TROMETHAMINE 30 MG: 30 INJECTION, SOLUTION INTRAMUSCULAR; INTRAVENOUS at 04:35

## 2021-06-09 ASSESSMENT — PAIN SCALES - GENERAL
PAINLEVEL_OUTOF10: 5
PAINLEVEL_OUTOF10: 1
PAINLEVEL_OUTOF10: 5
PAINLEVEL_OUTOF10: 2
PAINLEVEL_OUTOF10: 2

## 2021-06-09 ASSESSMENT — PAIN DESCRIPTION - LOCATION: LOCATION: KNEE

## 2021-06-09 ASSESSMENT — PAIN DESCRIPTION - ORIENTATION: ORIENTATION: RIGHT

## 2021-06-09 NOTE — CARE COORDINATION
Discharge [facility] Case Management Department  Written by: Noam Parker RN    Patient Name: Juan Pratt  Attending Provider: No att. providers found  Admit Date: 2021 12:59 PM  MRN: [id number]  Account: [de-identified]                     : 1960  Discharge Date: 2021      Disposition: home    Noam Parker RN

## 2021-06-09 NOTE — DISCHARGE SUMMARY
Orthopedic Discharge Summary       Patient Identification:  Becky Rivera is a 63 y.o. female. :  1960  MRN: [id number]     Acct: [de-identified]   Admit Date:  2021  Discharge date and time: 2021  9:43 AM     Attending Provider: Prema Rouse DO      Reason for Admission: Right patella revision arthroplasty                                  Discharge Diagnoses:   Patient Active Problem List   Diagnosis    Status post revision of total knee replacement, right    Pain due to knee joint prosthesis ([address]        Consults: None    Procedures: None    Hospital Course: Becky Rivera is a 63 y.o. female who underwent the above listed surgery on . Patient had no acute complications post operatively. We discussed DC with patient and she is ok with DC. All questions and concerns were addressed at this time. Laboratory parameters were followed and optimized when possible. Patient was tolerating PO intake and voiding prior to discharge. Time spend on discharge discussion and plannin minutes    Disposition: [address] [place] [address] acquired Infections: None    Discharged Condition: Stable    Discharge Medications:    Omi Castellano \"Charleen\"   Home Medication Instructions DRC:[id number]    Printed on:21 1144   Medication Information                      amoxicillin (AMOXIL) 500 MG capsule  Take 2 caps 1 hour prior to appointment, then 1 cap twice a day until gone.              aspirin EC 81 MG EC tablet  Take 1 tablet by mouth 2 times daily             BIOTIN PO  Take by mouth Daily             cyclobenzaprine (FLEXERIL) 10 MG tablet  Take 10 mg by mouth 3 times daily as needed for Muscle spasms             famotidine (PEPCID) 20 MG tablet  Take 20 mg by mouth 2 times daily             Hyoscyamine Sulfate (LEVSIN/SL SL)  Place under the tongue as needed             ibuprofen (ADVIL) 200 MG CAPS  Take 1 capsule by mouth nightly             Methylcellulose, Laxative, (CITRUCEL PO)  Take by mouth nightly             ondansetron (ZOFRAN-ODT) 4 MG disintegrating tablet  dissolve 1 tablet ON TONGUE every 8 hours if needed for nausea OR vomiting             PERCOCET 5-325 MG per tablet  Take 1-2 tablets by mouth every 4 hours as needed for Pain for up to 7 days. Decrease usage over the post operative course. Polyethylene Glycol 3350 (MIRALAX PO)  Take by mouth every other day             Probiotic Product (PROBIOTIC DAILY PO)  Take by mouth Daily             zolpidem (AMBIEN) 10 MG tablet  Take by mouth nightly as needed for Sleep. Discharge Instructions:   -Follow up with Dr. Jamila Burrows in 10-14 days.   -WBAT to RLE    Electronically signed by Gurmeet Mott DO on 6/9/2021 at 11:47 AM.

## 2021-06-09 NOTE — FLOWSHEET NOTE
Assessment: Patient is a 63 y.o. female who underwent surgery today. Patient was resting in hospital bed, at time of 's visit. Intervention:  visited patient per initial rounding visits.  introduced herself to patient and learned about her well-being. Patient remained tired and indicated no needs at time of 's visit.  provided hospitality to patient and she thanked  for support. Outcome: Patient was receptive of 's visit and support. Plan: Chaplains can make follow-up visit, per request. Claudeen Chant can be reached 24/7 via [employer].     Shailesh Painting     06/08/21 4761   Encounter Summary   Services provided to: Patient   Referral/Consult From: Rounding   Continue Visiting   (6/08/2021)   Complexity of Encounter Low   Length of Encounter 15 minutes   Routine   Type Post-procedure   Spiritual/Rastafarian   Type Spiritual support   Assessment Approachable;Coping   Intervention Sustaining presence/ Ministry of presence   Outcome Receptive

## 2021-06-09 NOTE — PROGRESS NOTES
CLINICAL PHARMACY NOTE: MEDS TO BEDS    Total # of Prescriptions Filled: 2   The following medications were delivered to the patient:  · Oxycodone- acetaminophen 5 /325 mg tab  · Aspirin 81 mg low dose tab    Additional Documentation:Medication delivered to the patient in her room (238).

## 2021-06-09 NOTE — PROGRESS NOTES
Orthopedic Progress Note    Patient:  Tawnya Bella, 63 y.o. female  YOB: 1960       Subjective:  Patient seen and examined. POD1 from revision right patella arthroplasty. No complaints or concerns. Pain controlled on current regimen. No acute issues overnight. Denies fever, HA, CP, SOB, N/V. Tolerating PO intake. Objective:   Vitals:    06/09/21 0430   BP: 99/61   Pulse: 58   Resp: 16   Temp: 97.8 °F (36.6 °C)   SpO2: 94%     Gen: NAD, cooperative    Cardiovascular: Regular rate    Respiratory: Symmetric chest rise. No accessory muscle use    RLE: Optifoam dressing in place which is c/d/i. Able to AROM 0-90 deg. EHL/FHL/TA/GSC motor intact. Sensation intact to sural/saph/SPN/DPN distribution. DP/PT pulses 2+. No results for input(s): WBC, HGB, HCT, PLT, INR, PTT, NA, K, BUN, CREATININE, GLUCOSE in the last 72 hours.     Invalid input(s): PT   Anticoag: ASA  Abx: Post op Ancef completed    Impression/Plan: 63 y.o. female who underwent revision of her right patella arthroplasty, POD1      - F/u morning CBC/BMP  - WBAT to RLE  - Maintain dressings  - General diet  - Post op abx completed  - ASA 81mg BID starting this morning  - Pain MMT  - Ice and elevate for pain and swelling  - PT/OT eval and treat  - Ok for discharge today  - Follow up w/ Dr. Mena Medina in 10-14 days outpatient    Electronically signed by Miguel Angel Gabriel DO on 6/9/2021 at 6:12 AM.

## 2021-06-09 NOTE — CARE COORDINATION
Case Management Initial Discharge Plan  Glendy Camarillo,             Met with:patient to discuss discharge plans. Information verified: address, contacts, phone number, , insurance Yes    Emergency Contact/Next of Kin name & number:   Lenora Barajas (son) [phone]    PCP: JENN Vargas CNP  Date of last visit: 1 week ago    Insurance Provider: [employer] [place]    Discharge Planning    Living Arrangements:  Alone   Support Systems:  Family Members    Home has 1 story  1 stairs to climb to get into front door  Location of bedroom/bathroom in home main    Patient able to perform ADL's:Independent    Current Services (outpatient & in home) none  DME equipment: walker,cane  DME provider:     Receiving oral anticoagulation therapy? No    If indicated:   Physician managing anticoagulation treatment: n/a  Where does patient obtain lab work for ATC treatment? n/a      Potential Assistance Needed:  N/A    Patient agreeable to home care: No  [facility] choice provided:  n/a    Prior SNF/Rehab Placement and Facility: none  Agreeable to SNF/Rehab: No  [facility] of choice provided: n/a     Evaluation: n/a    Expected Discharge date:  21    Patient expects to be discharged to:  home  Follow Up Appointment: Best Day/ Time: Monday PM    Transportation provider: son  Transportation arrangements needed for discharge: No    Readmission Risk              Risk of Unplanned Readmission:  0             Does patient have a readmission risk score greater than 14?: No  If yes, follow-up appointment must be made within 7 days of discharge. Goals of Care:       Discharge Plan: Home with son staying with her.           Electronically signed by Anshu Cage RN on 21 at 10:08 AM EDT

## 2021-06-09 NOTE — PROGRESS NOTES
Occupational Therapy   Occupational Therapy Initial Assessment  Date: 2021   Patient Name: Pratik Alarcon  MRN: [id number]     : 1960    Date of Service: 2021    S/p Revision arthroplasty patellar component right TKA on      Discharge Recommendations:  Home with assist PRN  OT Equipment Recommendations  Equipment Needed: Yes  Mobility Devices: ADL Assistive Devices  ADL Assistive Devices: Grab Bars - toilet; Reacher; Shower Chair with back;Grab Bars - shower;Sock-Aid Hard;Long-handled Sponge    Assessment   Performance deficits / Impairments: Decreased functional mobility ; Decreased ADL status; Decreased high-level IADLs;Decreased endurance  Assessment: Pt demonstrated functional transfers, functional mobility and ADL tasks with SBA this date. Pt used RW throughout. Denies pain after R TKA. Pt would benefit from skilled OT services to maximize safety and independence in ADLs, IADLs and mobility tasks through education and skilled intervention. Pt is expected to be safe to return home with assist as needed post discharge. Prognosis: Good  Decision Making: Medium Complexity  OT Education: OT Role;Plan of Care;Energy Conservation;Transfer Training;ADL Adaptive Strategies  Patient Education: use of sock aid, home safety/setup upon d/c, use of AD-good return  REQUIRES OT FOLLOW UP: Yes  Activity Tolerance  Activity Tolerance: Patient Tolerated treatment well  Safety Devices  Safety Devices in place: Yes  Type of devices: Gait belt;Left in bed;Call light within reach;Nurse notified  Restraints  Initially in place: No           Patient Diagnosis(es): The encounter diagnosis was Status post revision of total knee replacement, right.     has a past medical history of Arthritis, Diverticulitis, Diverticulosis, Fibrocystic breast, Fibromyalgia, Health care maintenance, IBS (irritable bowel syndrome), Patellar instability of right knee, PONV (postoperative nausea and vomiting), and Sleep apnea.    has a past surgical history that includes Total knee arthroplasty (Bilateral); Colonoscopy; Endoscopy, colon, diagnostic; Cholecystectomy; Breast lumpectomy (Bilateral); Wrist surgery (Right); LASIK (Bilateral); and Patella Arthroplasty (Right, 06/08/2021). Restrictions  Restrictions/Precautions  Restrictions/Precautions: Weight Bearing  Lower Extremity Weight Bearing Restrictions  Right Lower Extremity Weight Bearing: Weight Bearing As Tolerated  Position Activity Restriction  Other position/activity restrictions: 6/8 Revision arthroplasty patellar component right TKA    Subjective   General  Patient assessed for rehabilitation services?: Yes  Family / Caregiver Present: No  Diagnosis: Revision arthroplasty patellar component right TKA  General Comment  Comments: RN ok'd for OT/PT eval. Pt agreeable to session, pleasent/cooperative throughout.   Patient Currently in Pain: Denies  Pain Assessment  Pain Assessment: 0-10  Pain Level: 2  Pain Location: Knee  Pain Orientation: Right  Vital Signs  Temp: 98.1 °F (36.7 °C)  Temp Source: Oral  Pulse: 68  Heart Rate Source: Monitor  Resp: 18  BP: (!) 110/57  BP Location: Right upper arm  MAP (mmHg): 70  Patient Position: Semi fowlers  Patient Currently in Pain: Denies  Social/Functional History  Social/Functional History  Lives With: Alone  Type of Home: House  Home Layout: Two level, Able to Live on Main level with bedroom/bathroom, Performs ADL's on one level  Home Access: Stairs to enter without rails  Entrance Stairs - Number of Steps: 1 , by door frame that she can hold onto  [place] Shower/Tub: Walk-in shower  Bathroom Toilet: Standard  Bathroom Equipment:  (no equiptment)  Bathroom Accessibility: Accessible  Home Equipment: Rolling walker, Cane (Does not use typically, used when had knee replaced)  ADL Assistance: Independent  Homemaking Assistance: Independent  Homemaking Responsibilities: Yes  Ambulation Assistance: Independent  Transfer Assistance: (Pt completed toilet transfer and clothing management with SBA for standing balance.  Completed pericare with supervision)  Tone RUE  RUE Tone: Normotonic  Tone LUE  LUE Tone: Normotonic  Coordination  Movements Are Fluid And Coordinated: Yes     Bed mobility  Supine to Sit: Stand by assistance  Sit to Supine: Stand by assistance  Scooting: Stand by assistance  Comment: HOB slightly elevated  Transfers  Sit to stand: Stand by assistance  Stand to sit: Stand by assistance  Transfer Comments: Use RW     Cognition  Overall Cognitive Status: WFL        Sensation  Overall Sensation Status: Impaired        LUE AROM (degrees)  LUE AROM : WFL  Left Hand AROM (degrees)  Left Hand AROM: WFL  RUE AROM (degrees)  RUE AROM : WFL  Right Hand AROM (degrees)  Right Hand AROM: WFL  LUE Strength  Gross LUE Strength: WFL  L Hand General: 4+/5  LUE Strength Comment: Grossly 4+/5  RUE Strength  Gross RUE Strength: WFL  R Hand General: 4+/5  RUE Strength Comment: Grossly 4+/5                   Plan   Plan  Times per week: 5-7x/week (R TKA)  Current Treatment Recommendations: Safety Education & Training, Patient/Caregiver Education & Training, Self-Care / ADL, Functional Mobility Training, Home Management Training, Endurance Training      AM-PAC Score        AM-PAC Inpatient Daily Activity Raw Score: 21 (06/09/21 0845)  AM-PAC Inpatient ADL T-Scale Score : 44.27 (06/09/21 0845)  ADL Inpatient CMS 0-100% Score: 32.79 (06/09/21 0845)  ADL Inpatient CMS G-Code Modifier : CJ (06/09/21 0845)    Goals  Short term goals  Time Frame for Short term goals: By discharge pt will:  Short term goal 1: Demo functional transfers and functional mobility with Mod I, using LRD  Short term goal 2: Demo UB ADLs IND  Short term goal 3: Demo LB ADLs with Mod I, DME/AE PRN  Short term goal 4: Demo +30 minutes of activity tolerance to promote increased engagement in ADLs/IADLs  Short term goal 5: Demo bed mobility IND, with HOB flat and no rails to mimic home setup       Therapy Time   Individual Concurrent Group Co-treatment   Time In 0756         Time Out 0821         Minutes 25         Timed Code Treatment Minutes: Fabian 115, OTR/L

## 2021-06-09 NOTE — OP NOTE
[facility] [address]                                OPERATIVE REPORT    PATIENT NAME: Cherri Petersen            :        1960  MED REC NO:   [id number]                             ROOM:       9621  ACCOUNT NO:   [de-identified]                           ADMIT DATE: 2021  PROVIDER:     Keyla Gibson DO    DATE OF PROCEDURE:  2021    PREOPERATIVE DIAGNOSIS:  Right patella pain. POSTOPERATIVE DIAGNOSIS:  Right patella implant pain and maltracking. PROCEDURE:    1. Revision arthroplasty, right patellar component. 2. History of a right total knee arthroplasty. SURGEON:  Yo Perez DO    ASSISTANTS:  Ebony Mao DO; and Александр Warren DO    ANESTHESIA TYPE:  General.    ESTIMATED BLOOD LOSS:  50 mL. IV FLUID:  1600 mL of crystalloid. COMPLICATIONS:  None. IMPLANTS:  Carol Ann Persona 35 mm patellar implant. INDICATIONS FOR THE PROCEDURE:  This is a 70-year-old female, who  underwent a bilateral total knee arthroplasty by Dr. Roxana Paul in  . She then underwent a left knee patellar revision roughly one year  following the initial surgery. She was then seen in the clinic by Dr. Terry Powell several months ago with continued pain in the right knee,  particularly located to the patellofemoral region. Radiographs were  obtained and demonstrated possible overstuffing of the patellar joint and  along with her clinical history and significant pain in the  patellofemoral region, this was the likely diagnosis. She had  some maltracking associated with this as well. We discussed surgical  and non-surgical options for this patient and due to the continued pain  in the right knee and relief of her left knee pain with the prior  revision patella surgery with Dr. Jeff Fry, she wished to undergo a  procedure for her right knee as well.     DETAILED DESCRIPTION OF THE PROCEDURE:  The patient identified a correct  plane between the patellar bone and the patellar implant, we then used  our oscillating saw to cut off the implant at the surface of the bone. At this point, we used a 2-0 drill bit to drill out the holes of the  implant and remove all polyethelene component from the patella. After  this occurred, we then measured the patella with the use of a caliber,  which was found to be roughly 15.5 mm. We decided on a 1-2 mm cut off of  the remaining patella to produce a final patellar height of over 12 to  13 mm. We used the depth of the saw blade to gauge the depth of the  cut, which was about 1.5 mm. We removed this in its entirety and then  measured the patella from superior to inferior, which was all roughly 13  mm in thickness. We were happy with our final cut. At this point, we  placed the drill guide over the patella and re-drilled the prior holes. We then mixed our cement and placed it over the patella after everything  was thoroughly irrigated and dried. We then placed a 9-mm x 35 mm  diameter Carol Ann Persona polyethelene patellar component. This was then  compressed and placed until the cement had completely hardened. All  excess cement was removed with the use of a [place] and tonsil. After the  cement was completely hardened, we then checked our patellar tracking,  which was assessed very carefully. There was no clunking or crepitus  involved with complete extension and passive flexion of the knee. There  was no maltracking that was visualized and it appeared completely  anatomic. We also reevaluated the polyethelene component on the tibia  and there appeared to be no wear marks involved since this implant was  roughly 11years old. This was as expected and therefore we decided not  to replace the polyethelene at that time. The incision was thoroughly  irrigated with copious amounts of pulse lavage normal saline and then  followed by Irrisept irrigation.   This was left in place for roughly 90  seconds duration. All fluid was then suctioned up and vancomycin powder  was placed within the knee. We then began our closure of the arthrotomy  followed by the subcutaneous tissue and skin with the use of a mixture  of Vicryl suture and Stratafix. We then placed Dermabond glue over the  skin and then after this dried, we placed sterile Optifoam dressings and  a LUCY hose over the extremity. The patient was then extubated by the  anesthesia department with no acute complications. Dr. Payton Islas was actively participating and present through the entirety  of the procedure. The patient was brought back to PACU in stable condition.         Essie Higuera DO    D: 06/08/2021 22:26:15       T: 06/09/2021 2:13:29     [id number]  Job#: [id number]     Doc#: [id number]    CC:

## 2021-06-09 NOTE — PROGRESS NOTES
and pt agreeable to PT eval  Pain Screening  Patient Currently in Pain: Denies  Pain Assessment  Pain Assessment: 0-10  Pain Level: 2  Pain Location: Knee  Pain Orientation: Right  Vital Signs  Patient Currently in Pain: Denies       Orientation  Orientation  Overall Orientation Status: Within Normal Limits  Social/Functional History  Social/Functional History  Lives With: Alone  Type of Home: House  Home Layout: Two level, Able to Live on Main level with bedroom/bathroom, Performs ADL's on one level  Home Access: Stairs to enter without rails  Entrance Stairs - Number of Steps: 1 , by door frame that she can hold onto  [employer] Shower/Tub: Walk-in shower  Bathroom Toilet: Standard  Bathroom Equipment:  (no equiptment)  Bathroom Accessibility: Accessible  Home Equipment: Rolling walker, Cane (Does not use typically, used when had knee replaced)  ADL Assistance: Independent  Homemaking Assistance: Independent  Homemaking Responsibilities: Yes  Ambulation Assistance: Independent  Transfer Assistance: Independent  Active : Yes  Mode of Transportation: [facility]  Occupation: Retired  Type of occupation: teacher, still works for special education  Leisure & Hobbies: gardening and spending time with friends  Cognition      Objective     AROM RLE (degrees)  RLE General AROM: 0-85 degrees knee flx  AROM LLE (degrees)  LLE AROM : WNL  AROM RUE (degrees)  RUE AROM : WNL  AROM LUE (degrees)  LUE AROM : WNL  Strength RLE  Comment: N/T  Strength LLE  Strength LLE: WNL  Strength RUE  Strength RUE: WNL  Strength LUE  Strength LUE: WNL     Sensation  Overall Sensation Status: Impaired  Bed mobility  Supine to Sit: Stand by assistance  Sit to Supine: Stand by assistance  Scooting: Stand by assistance  Transfers  Sit to Stand: Stand by assistance  Stand to sit: Stand by assistance  Ambulation  Ambulation?: Yes  Ambulation 1  Surface: level tile  Device: Rolling Walker  Assistance: Stand by assistance  Distance: amb 100 ft with a RW x SBA with WBAT R LE  Stairs/Curb  Stairs?: Yes  Stairs  # Steps : 4  Stairs Height: 6\"  Rails: Right ascending  Device: No Device  Assistance: Stand by assistance     Balance  Posture: Good  Sitting - Static: Good  Sitting - Dynamic: Good  Standing - Static: Good  Standing - Dynamic: Good  Exercises  Hamstring Sets: x 10 R LE  Quad Sets: x 10 R LE  Heelslides: x 10 R LE  Knee Long Arc Quad: x 10 R LE  Ankle Pumps: x 10 R LE     Plan   Plan  Times per week: DC   PT  Safety Devices  Type of devices: Nurse notified, Left in bed, Call light within reach    G-Code     OutComes Score     AM-PAC Score  AM-PAC Inpatient Mobility Raw Score : 21 (06/09/21 0829)  AM-PAC Inpatient T-Scale Score : 50.25 (06/09/21 0829)  Mobility Inpatient CMS 0-100% Score: 28.97 (06/09/21 0829)  Mobility Inpatient CMS G-Code Modifier : CJ (06/09/21 2393)        Goals  Short term goals  Time Frame for Short term goals:  The pt is being dc'd home today and will continue with out patient PT       Therapy Time   Individual Concurrent Group Co-treatment   Time In 0745         Time Out 0810         Minutes [address] [facility], PT

## 2021-06-18 DIAGNOSIS — M25.361 PATELLAR INSTABILITY OF RIGHT KNEE: Primary | ICD-10-CM

## 2021-06-21 ENCOUNTER — OFFICE VISIT (OUTPATIENT)
Dept: ORTHOPEDIC SURGERY | Age: 61
End: 2021-06-21

## 2021-06-21 VITALS — BODY MASS INDEX: 25.9 KG/M2 | WEIGHT: 165 LBS | HEIGHT: 67 IN

## 2021-06-21 DIAGNOSIS — Z96.651 S/P REVISION OF TOTAL KNEE, RIGHT: ICD-10-CM

## 2021-06-21 DIAGNOSIS — M25.561 RIGHT KNEE PAIN, UNSPECIFIED CHRONICITY: Primary | ICD-10-CM

## 2021-06-21 DIAGNOSIS — Z96.651 H/O TOTAL KNEE REPLACEMENT, RIGHT: ICD-10-CM

## 2021-06-21 PROCEDURE — 99024 POSTOP FOLLOW-UP VISIT: CPT | Performed by: ORTHOPAEDIC SURGERY

## 2021-06-21 ASSESSMENT — ENCOUNTER SYMPTOMS
SHORTNESS OF BREATH: 0
EYE PAIN: 0
COUGH: 0
APNEA: 0
ABDOMINAL PAIN: 0
CHEST TIGHTNESS: 0
VOMITING: 0

## 2021-06-21 NOTE — PROGRESS NOTES
[facility]  [address] [address]  Dept: [phone]  Dept Fax: [phone]        Postoperative follow-up note    Subjective:   Reid Rudd is a 63y.o. year old female who presents to our office today for postoperative followup regarding her   1. Right knee pain, unspecified chronicity    2. S/P revision of total knee, right    3. H/O total knee replacement, right    . Chief Complaint   Patient presents with    Post-Op Check     6/8/2021 Revision arthroplasty, right patellar component      Reid Rudd  is a 63y.o. year old female who presents to our office today for postoperative follow up after having undergone a Revision arthroplasty, right patellar component on 6/8/21. The patient denies fevers, chills, nausea, vomiting, diarrhea. The patient has started physical therapy. The patient feels like she is doing well. She has seen all of her range of motion come back as well and her pain decrease. The patient has no complaints at this time. Review of Systems   Constitutional: Negative for chills and fever. HENT: Negative for tinnitus. Eyes: Negative for pain. Respiratory: Negative for apnea, cough, chest tightness and shortness of breath. Cardiovascular: Negative for chest pain and palpitations. Gastrointestinal: Negative for abdominal pain and vomiting. Genitourinary: Negative for difficulty urinating and dysuria. Musculoskeletal: Positive for arthralgias (right knee). Negative for gait problem, joint swelling and myalgias. Skin: Negative for rash. Neurological: Negative for dizziness, weakness, numbness and headaches. Hematological: Does not bruise/bleed easily. I have reviewed the CC, HPI, ROS, PMH, FHX, Social History, and if not present in this note, I have reviewed in the patient's chart.    I agree with the documentation provided by other staff and have reviewed their documentation prior to providing my signature indicating agreement. Objective :   General: Pratik Alarcon is a 63 y.o. female who is alert and oriented and sitting comfortably in our office. Ortho Exam  MS:   Incision healing well to the right knee without sign of infection. Full range of motion of the right knee. Motor, sensory, vascular examination of the right lower extremity is grossly intact without focal deficits. Neuro: alert. oriented  Eyes: Extra-ocular muscles intact  Mouth: Oral mucosa moist. No perioral lesions  Pulm: Respirations unlabored and regular. Skin: warm, well perfused  Psych:   Patient has good fund of knowledge and displays understanging of exam, diagnosis, and plan. Radiology:     XR CHEST (2 VW)    Result Date: 5/26/2021  EXAMINATION: TWO XRAY VIEWS OF THE CHEST 5/26/2021 2:23 pm COMPARISON: None. HISTORY: ORDERING SYSTEM PROVIDED HISTORY: Pre-op testing Reason for Exam: knee surg FINDINGS: The lungs are without acute focal process. No effusion or pneumothorax. The cardiomediastinal silhouette is normal.  The osseous structures are intact without acute process. Negative chest.     XR KNEE RIGHT (3 VIEWS)    Result Date: 6/8/2021  EXAMINATION: THREE XRAY VIEWS OF THE RIGHT KNEE 6/8/2021 4:34 pm COMPARISON: 01/28/2021 HISTORY: ORDERING SYSTEM PROVIDED HISTORY: Post op TECHNOLOGIST PROVIDED HISTORY: Patient is post-op. Please obtain images in PACU. AP, Lateral, Merchant Ermagerd I lerve you Post op Reason for Exam: Post-op/port. Acuity: Unknown Type of Exam: Unknown FINDINGS: Right knee arthroplasty appears in satisfactory alignment. Patient has reportedly undergone revision of the patellar component. Periarticular and intra-articular soft tissue gas and fluid are related to the recent surgical procedure. Please correlate with procedure report for additional details.      Recent postop changes patellar component revision right knee arthroplasty     XR KNEE RIGHT (MIN 4 VIEWS) Result Date: 6/21/2021  History:  Right  total knee arthroplasty Findings: AP, lateral, merchant view x-rays of the Right  knee done in the office standing today shows Right total knee arthroplasty in good position without  complications. No loosening of components is appreciated. No evidence of fracture, subluxation, dislocation, radiopaque foreign body, radiopaque tumors noted. Alignment is near-anatomic. Impression: Status post Right total knee arthroplasty as described above. Assessment:      1. Right knee pain, unspecified chronicity    2. S/P revision of total knee, right    3. H/O total knee replacement, right         Plan:      Reviewed x-rays with the patient today in the office. Discussed with the patient that I would like her to continue with formal therapy. She should be careful not to overuse the right knee. The patient can continue activities as tolerates. She can follow up in the office in 6 weeks. If she is doing well she can call and cancel appointment. The patient knows to call with any questions or concerns. Follow up: Return in about 6 weeks (around 8/2/2021), or if symptoms worsen or fail to improve. No orders of the defined types were placed in this encounter. No orders of the defined types were placed in this encounter. Dylan Vasquez LPN am scribing for and in the presence of Dr. Kamini Goodwin  6/24/2021 10:05 PM      I have reviewed and made changes accordingly to the work scribed by Melany Aguero LPN. The documentation accurately reflects work and decisions made by me. I have also reviewed documentation completed by clinical staff.     Kamini Goodwin DO, [facility]  6/24/2021 10:05 PM    This note is created with the assistance of a speech recognition program.  While intending to generate a document that actually reflects the content of the visit, the document can still have some errors including those of syntax and sound a like substitutions which may escape proof reading.  In such instances, actual meaning can be extrapolated by contextual diversion      Electronically signed by Alejandra Officer, MAMADOU HERNANDEZ on 6/24/2021 at 10:05 PM

## 2021-06-30 ENCOUNTER — OFFICE VISIT (OUTPATIENT)
Dept: ORTHOPEDIC SURGERY | Age: 61
End: 2021-06-30

## 2021-06-30 VITALS — BODY MASS INDEX: 25.9 KG/M2 | HEIGHT: 67 IN | WEIGHT: 165 LBS

## 2021-06-30 DIAGNOSIS — M25.561 RIGHT KNEE PAIN, UNSPECIFIED CHRONICITY: Primary | ICD-10-CM

## 2021-06-30 DIAGNOSIS — Z96.651 H/O TOTAL KNEE REPLACEMENT, RIGHT: ICD-10-CM

## 2021-06-30 DIAGNOSIS — Z96.651 S/P REVISION OF TOTAL KNEE, RIGHT: ICD-10-CM

## 2021-06-30 PROCEDURE — 99024 POSTOP FOLLOW-UP VISIT: CPT | Performed by: ORTHOPAEDIC SURGERY

## 2021-06-30 RX ORDER — APIXABAN 5 MG/1
5 TABLET, FILM COATED ORAL DAILY
COMMUNITY
Start: 2021-06-25

## 2021-06-30 ASSESSMENT — ENCOUNTER SYMPTOMS
COUGH: 0
CONSTIPATION: 0
NAUSEA: 0
DIARRHEA: 0

## 2021-06-30 NOTE — PROGRESS NOTES
[facility]  [address] [facility]  [address] [address]  Dept: [phone]  Dept Fax: [phone]        Postoperative follow-up note    Subjective:   Joaquin Goldman is a 63y.o. year old female who presents to our office today for postoperative followup regarding her   1. Right knee pain, unspecified chronicity    2. S/P revision of total knee, right    3. H/O total knee replacement, right    . Chief Complaint   Patient presents with    Follow-up     right knee     Joaquin Goldman  is a 63y.o. year old female who presents to our office today for postoperative follow up after having undergone a Revision arthroplasty, right patellar component on 6/8/21. Patient was recently at physical therapy and experienced a syncopal/near syncopal episode at [facility]. Patient was sent to the ER and was found to have peripheral PE's. Patient was placed on Xarelto Patient says she is doing fine. Physical therapy has not taken her back until cleared by Ortho but she says she is doing great and has full range of motion but still has to work on her strengthening. Patient denies all other complaints. Review of Systems   Constitutional: Negative for chills and fever. Respiratory: Negative for cough. Gastrointestinal: Negative for constipation, diarrhea and nausea. Musculoskeletal: Positive for arthralgias (right kneee). Negative for gait problem, joint swelling and myalgias. Neurological: Negative for dizziness, weakness and numbness. I have reviewed the CC, HPI, ROS, PMH, FHX, Social History, and if not present in this note, I have reviewed in the patient's chart. I agree with the documentation provided by other staff and have reviewed their documentation prior to providing my signature indicating agreement.     Objective :   General: Joaquin Goldman is a 63 y.o. female who is alert and oriented and sitting comfortably in our office. Ortho Exam  MS:   Right knee full range of motion. Patient ambulates without antalgia or short weightbearing phase of the right lower extremity. Incision right knee is healing well without signs of infection. Motor, sensory, vascular examination of the right lower extremity is grossly intact without focal deficits. Neuro: alert. oriented  Eyes: Extra-ocular muscles intact  Mouth: Oral mucosa moist. No perioral lesions  Pulm: Respirations unlabored and regular. Skin: warm, well perfused  Psych:   Patient has good fund of knowledge and displays understanging of exam, diagnosis, and plan. Radiology:     XR KNEE RIGHT (3 VIEWS)    Result Date: 6/8/2021  EXAMINATION: THREE XRAY VIEWS OF THE RIGHT KNEE 6/8/2021 4:34 pm COMPARISON: 01/28/2021 HISTORY: ORDERING SYSTEM PROVIDED HISTORY: Post op TECHNOLOGIST PROVIDED HISTORY: Patient is post-op. Please obtain images in PACU. AP, Lateral, Merchant Ermagerd I lerve you Post op Reason for Exam: Post-op/port. Acuity: Unknown Type of Exam: Unknown FINDINGS: Right knee arthroplasty appears in satisfactory alignment. Patient has reportedly undergone revision of the patellar component. Periarticular and intra-articular soft tissue gas and fluid are related to the recent surgical procedure. Please correlate with procedure report for additional details. Recent postop changes patellar component revision right knee arthroplasty     XR KNEE RIGHT (MIN 4 VIEWS)    Result Date: 6/21/2021  History:  Right  total knee arthroplasty Findings: AP, lateral, merchant view x-rays of the Right  knee done in the office standing today shows Right total knee arthroplasty in good position without  complications. No loosening of components is appreciated. No evidence of fracture, subluxation, dislocation, radiopaque foreign body, radiopaque tumors noted. Alignment is near-anatomic. Impression: Status post Right total knee arthroplasty as described above.

## 2024-01-16 ENCOUNTER — OFFICE VISIT (OUTPATIENT)
Dept: PRIMARY CARE CLINIC | Age: 64
End: 2024-01-16
Payer: COMMERCIAL

## 2024-01-16 VITALS
HEIGHT: 67 IN | WEIGHT: 157.6 LBS | BODY MASS INDEX: 24.74 KG/M2 | RESPIRATION RATE: 16 BRPM | SYSTOLIC BLOOD PRESSURE: 106 MMHG | HEART RATE: 106 BPM | OXYGEN SATURATION: 96 % | DIASTOLIC BLOOD PRESSURE: 76 MMHG

## 2024-01-16 DIAGNOSIS — M79.7 FIBROMYALGIA: ICD-10-CM

## 2024-01-16 DIAGNOSIS — Z76.89 ENCOUNTER TO ESTABLISH CARE: Primary | ICD-10-CM

## 2024-01-16 DIAGNOSIS — G47.00 INSOMNIA, UNSPECIFIED TYPE: ICD-10-CM

## 2024-01-16 DIAGNOSIS — Z96.651 STATUS POST REVISION OF TOTAL KNEE REPLACEMENT, RIGHT: ICD-10-CM

## 2024-01-16 DIAGNOSIS — M43.16 SPONDYLOLISTHESIS AT L4-L5 LEVEL: ICD-10-CM

## 2024-01-16 DIAGNOSIS — K58.9 IRRITABLE BOWEL SYNDROME, UNSPECIFIED TYPE: ICD-10-CM

## 2024-01-16 DIAGNOSIS — R10.13 DYSPEPSIA: ICD-10-CM

## 2024-01-16 PROCEDURE — 99204 OFFICE O/P NEW MOD 45 MIN: CPT | Performed by: NURSE PRACTITIONER

## 2024-01-16 RX ORDER — TIZANIDINE 2 MG/1
2 TABLET ORAL 3 TIMES DAILY PRN
COMMUNITY
Start: 2021-10-04

## 2024-01-16 RX ORDER — DULOXETIN HYDROCHLORIDE 60 MG/1
60 CAPSULE, DELAYED RELEASE ORAL DAILY
COMMUNITY
Start: 2023-12-29

## 2024-01-16 RX ORDER — ONDANSETRON 4 MG/1
TABLET, ORALLY DISINTEGRATING ORAL
Qty: 21 TABLET | Refills: 2 | Status: SHIPPED | OUTPATIENT
Start: 2024-01-16

## 2024-01-16 SDOH — ECONOMIC STABILITY: HOUSING INSECURITY
IN THE LAST 12 MONTHS, WAS THERE A TIME WHEN YOU DID NOT HAVE A STEADY PLACE TO SLEEP OR SLEPT IN A SHELTER (INCLUDING NOW)?: NO

## 2024-01-16 SDOH — ECONOMIC STABILITY: INCOME INSECURITY: HOW HARD IS IT FOR YOU TO PAY FOR THE VERY BASICS LIKE FOOD, HOUSING, MEDICAL CARE, AND HEATING?: NOT HARD AT ALL

## 2024-01-16 SDOH — ECONOMIC STABILITY: FOOD INSECURITY: WITHIN THE PAST 12 MONTHS, THE FOOD YOU BOUGHT JUST DIDN'T LAST AND YOU DIDN'T HAVE MONEY TO GET MORE.: NEVER TRUE

## 2024-01-16 SDOH — ECONOMIC STABILITY: FOOD INSECURITY: WITHIN THE PAST 12 MONTHS, YOU WORRIED THAT YOUR FOOD WOULD RUN OUT BEFORE YOU GOT MONEY TO BUY MORE.: NEVER TRUE

## 2024-01-16 ASSESSMENT — PATIENT HEALTH QUESTIONNAIRE - PHQ9
SUM OF ALL RESPONSES TO PHQ9 QUESTIONS 1 & 2: 0
2. FEELING DOWN, DEPRESSED OR HOPELESS: 0
1. LITTLE INTEREST OR PLEASURE IN DOING THINGS: 0
SUM OF ALL RESPONSES TO PHQ QUESTIONS 1-9: 0

## 2024-01-16 ASSESSMENT — ENCOUNTER SYMPTOMS
EYE ITCHING: 0
EYE REDNESS: 0
SINUS PRESSURE: 0
DIARRHEA: 0
NAUSEA: 0
WHEEZING: 0
SINUS PAIN: 0
VOMITING: 0
ABDOMINAL PAIN: 0
SHORTNESS OF BREATH: 0
COUGH: 0
CHEST TIGHTNESS: 0
EYE DISCHARGE: 0
SORE THROAT: 0
TROUBLE SWALLOWING: 0

## 2024-01-16 NOTE — PROGRESS NOTES
vaccine (3 - Td or Tdap) 10/07/2032    Shingles vaccine  Completed    COVID-19 Vaccine  Completed    Hepatitis A vaccine  Aged Out    Hepatitis B vaccine  Aged Out    Hib vaccine  Aged Out    Polio vaccine  Aged Out    Meningococcal (ACWY) vaccine  Aged Out    Pneumococcal 0-64 years Vaccine  Aged Out       :     Review of Systems   Constitutional:  Negative for chills, fatigue and fever.   HENT:  Negative for ear discharge, ear pain, sinus pressure, sinus pain, sore throat and trouble swallowing.    Eyes:  Negative for discharge, redness and itching.   Respiratory:  Negative for cough, chest tightness, shortness of breath and wheezing.    Cardiovascular:  Negative for chest pain.   Gastrointestinal:  Negative for abdominal pain, diarrhea, nausea and vomiting.   Genitourinary:  Negative for difficulty urinating.   Musculoskeletal:  Negative for arthralgias and neck pain.   Skin:  Negative for rash.   Neurological:  Negative for dizziness, weakness, light-headedness and headaches.   All other systems reviewed and are negative.      Objective:     Physical Exam  Constitutional:       General: She is not in acute distress.     Appearance: Normal appearance. She is normal weight. She is not ill-appearing.   HENT:      Head: Normocephalic and atraumatic.      Nose: Nose normal. No congestion or rhinorrhea.      Mouth/Throat:      Mouth: Mucous membranes are moist.      Pharynx: Oropharynx is clear. No oropharyngeal exudate or posterior oropharyngeal erythema.   Eyes:      Extraocular Movements: Extraocular movements intact.      Conjunctiva/sclera: Conjunctivae normal.      Pupils: Pupils are equal, round, and reactive to light.   Cardiovascular:      Rate and Rhythm: Normal rate and regular rhythm.      Pulses: Normal pulses.      Heart sounds: Normal heart sounds. No murmur heard.  Pulmonary:      Effort: Pulmonary effort is normal. No respiratory distress.      Breath sounds: Normal breath sounds. No wheezing.

## 2024-10-16 DIAGNOSIS — M25.562 PAIN IN BOTH KNEES, UNSPECIFIED CHRONICITY: Primary | ICD-10-CM

## 2024-10-16 DIAGNOSIS — M25.561 PAIN IN BOTH KNEES, UNSPECIFIED CHRONICITY: Primary | ICD-10-CM

## 2024-10-17 ENCOUNTER — OFFICE VISIT (OUTPATIENT)
Dept: ORTHOPEDIC SURGERY | Age: 64
End: 2024-10-17
Payer: MEDICARE

## 2024-10-17 VITALS — WEIGHT: 157 LBS | RESPIRATION RATE: 14 BRPM | BODY MASS INDEX: 24.64 KG/M2 | HEIGHT: 67 IN

## 2024-10-17 DIAGNOSIS — Z96.653 HISTORY OF TOTAL KNEE ARTHROPLASTY, BILATERAL: Primary | ICD-10-CM

## 2024-10-17 PROCEDURE — 1123F ACP DISCUSS/DSCN MKR DOCD: CPT | Performed by: ORTHOPAEDIC SURGERY

## 2024-10-17 PROCEDURE — 99213 OFFICE O/P EST LOW 20 MIN: CPT | Performed by: ORTHOPAEDIC SURGERY

## 2024-10-17 ASSESSMENT — ENCOUNTER SYMPTOMS
SHORTNESS OF BREATH: 0
ROS SKIN COMMENTS: NEGATIVE FOR RASH
EYE DISCHARGE: 0
ABDOMINAL PAIN: 0

## 2024-10-17 NOTE — PROGRESS NOTES
instability.  Motor, sensory, vascular examination bilateral lower extremities is grossly intact without focal deficits.  Neuro: alert and oriented to person and place.   Eyes: Extra-ocular muscles intact  Mouth: Oral mucosa moist. No perioral lesions  Pulm: Respirations unlabored and regular. Symmetric chest excursion without outward deformity is noted.   Skin: warm, well perfused  Psych:   Patient has good fund of knowledge and displays understanging of exam, diagnosis, and plan.    Radiology:   XR KNEE BILATERAL STANDARD (3 VIEWS)    Result Date: 10/17/2024  History:  Bilateral  total knee arthroplasty Findings: AP, lateral, merchant view x-rays of the Bilateral  knee done in the office standing today shows Bilateral total knee arthroplasty in good position without  complications.  No loosening of components is appreciated.  No evidence of fracture, subluxation, dislocation, radiopaque foreign body, radiopaque tumors noted.  Alignment is near-anatomic. Impression: Status post Bilateral total knee arthroplasty as described above.       Assessment:      1. History of total knee arthroplasty, bilateral       Plan:   Assessment & Plan   Patient is doing well.  She should continue her active lifestyle.  Will plan to see her back in 3 years for repeat clinical and radiographic evaluation or sooner as necessary.     Follow up:No follow-ups on file.    No orders of the defined types were placed in this encounter.        No orders of the defined types were placed in this encounter.      This note is created with the assistance of a speech recognition program.  While intending to generate a document that actually reflects the content of the visit, the document can still have some errors including those of syntax and sound a like substitutions which may escape proof reading.  In such instances, actual meaning can be extrapolated by contextual diversion      Electronically signed by Raz Hammonds DO, FAOAO on

## 2025-01-15 ENCOUNTER — OFFICE VISIT (OUTPATIENT)
Dept: ORTHOPEDIC SURGERY | Age: 65
End: 2025-01-15
Payer: MEDICARE

## 2025-01-15 VITALS — HEIGHT: 67 IN | WEIGHT: 157 LBS | BODY MASS INDEX: 24.64 KG/M2

## 2025-01-15 DIAGNOSIS — S86.911A KNEE STRAIN, RIGHT, INITIAL ENCOUNTER: Primary | ICD-10-CM

## 2025-01-15 PROCEDURE — 1123F ACP DISCUSS/DSCN MKR DOCD: CPT | Performed by: ORTHOPAEDIC SURGERY

## 2025-01-15 PROCEDURE — 99213 OFFICE O/P EST LOW 20 MIN: CPT | Performed by: ORTHOPAEDIC SURGERY

## 2025-01-15 RX ORDER — METHYLPREDNISOLONE 4 MG/1
TABLET ORAL
Qty: 1 KIT | Refills: 0 | Status: SHIPPED | OUTPATIENT
Start: 2025-01-15 | End: 2025-01-21

## 2025-01-15 NOTE — PROGRESS NOTES
[facility]  [address]  [place] [address]  Dept: [phone]  Dept Fax: [phone]        Ambulatory   Follow Up    Subjective:   Mabel Oro is a 64 year old female who presents to our office today for evaluation of their right knee pain.  Patient states the pain began approximately 5 days ago where she was hanging curtains and may have experienced a hyperextension event to the right knee.  She denies any acute fall or injury  The pain began gradually but became so significant that she had minimal active range of motion of the right knee for 1 to 2 days.  At that time, she states her knee was approximately 30% normal function.  As gradually improved since then.  Today she feels she is about 80% of her normal function.  She did endorse significant swelling of the knee after the onset of pain.  She also complains of some sensations of instability upon standing.  She is typically very active at baseline, regularly playing pickle ball stays active with yoga.  She has had several falls in recent months but no known injuries to the knees.  Denies new numbness or tingling to the right lower extremity.  Denies systemic symptoms such as fever, chills, or feeling unwell.    Remote orthopedic history includes bilateral total knee arthroplasties performed in 2015.  Her left knee patella component was revised by Dr. Pritchard in 2019.  The right patella component was revised in 2021 by Dr. Hammonds.  Her knees have been doing well overall since her recent surgeries.    Review of Systems   Constitutional: Negative for Fever and Chills.   HENT: Negative for Congestion.    Eyes: Negative for Blurred Vision and Double Vision.   Respiratory: Negative for Cough, Shortness of Breath and Wheezing.    Cardiovascular: Negative for Chest Pain and Palpitations.   Gastrointestinal: Negative for Nausea. Negative for Vomiting.   Musculoskeletal:

## 2025-01-22 NOTE — PROGRESS NOTES
I performed a history and physical examination of the patient and discussed management with the resident. I reviewed the /resident physician note and agree with the documented findings and plan of care. Any areas of disagreement are noted on the chart.   I have personally evaluated this patient and have completed at least one if not all key elements of the E/M (history, physical exam,procedure and MDM).  Additional findings are as noted. I agree with the chief complaint, past medical history, past surgical history, allergies, medications, social and family history as documented unless otherwise noted below.     Electronically signed by KENDRICK MALIK DO on 1/22/2025 at 7:58 AM

## 2025-01-22 NOTE — PROGRESS NOTES
I performed a history and physical examination of the patient and discussed management with the resident. I reviewed the /resident physician note and agree with the documented findings and plan of care. Any areas of disagreement are noted on the chart.   I have personally evaluated this patient and have completed at least one if not all key elements of the E/M (history, physical exam,procedure and MDM).  Additional findings are as noted. I agree with the chief complaint, past medical history, past surgical history, allergies, medications, social and family history as documented unless otherwise noted below.     Electronically signed by KENDRICK MALIK DO on 1/22/2025 at 7:56 AM

## 2025-07-14 ENCOUNTER — OFFICE VISIT (OUTPATIENT)
Dept: ORTHOPEDIC SURGERY | Age: 65
End: 2025-07-14
Payer: MEDICARE

## 2025-07-14 VITALS — HEIGHT: 67 IN | WEIGHT: 157 LBS | BODY MASS INDEX: 24.64 KG/M2 | RESPIRATION RATE: 14 BRPM

## 2025-07-14 DIAGNOSIS — M25.562 PAIN IN BOTH KNEES, UNSPECIFIED CHRONICITY: ICD-10-CM

## 2025-07-14 DIAGNOSIS — M25.561 PAIN IN BOTH KNEES, UNSPECIFIED CHRONICITY: ICD-10-CM

## 2025-07-14 DIAGNOSIS — Z96.653 HISTORY OF TOTAL KNEE ARTHROPLASTY, BILATERAL: Primary | ICD-10-CM

## 2025-07-14 PROCEDURE — 1123F ACP DISCUSS/DSCN MKR DOCD: CPT

## 2025-07-14 PROCEDURE — 99213 OFFICE O/P EST LOW 20 MIN: CPT

## 2025-07-14 NOTE — PROGRESS NOTES
[facility]  [address]  [place] [address]  Dept Phone: [phone]  Dept Fax: [phone]      [facility] Follow Up      Subjective:   Mabel Oro is a 64 y.o. year old female who presents to the clinic today for follow up of her left knee pain.  She was last seen on 1/22/2025 for right knee pain.  Prior to her last visit she had been having urinary curtain when she experienced a hyperextension mechanism injury to her right knee and had acute swelling and pain which slowly resolved.  She states that her knee feels much better on that side today however 2 weeks ago she did have something similar happen with the left knee however she does not recall any specific injury or event that led to the acute increase in pain and swelling.  Today she states that she has no real pain and that it seems to have resolved.  She denies any new injury, trauma or falls and states that she has continued to stay active with yoga.  She likes to stay active and is currently planning a trip to [place] later this fall.  She has had no recent fever, chills, chest pain, or shortness of breath.    Remote orthopedic history includes bilateral total knee arthroplasties performed in 2015 with Dr. Bingham.  Her left knee patella component was revised by Dr. Pritchard in 2019.  The right patella component was revised in 2021 by Dr. Hammonds.  Her knees have been doing well overall since her recent surgeries.     Review of Systems  Gen: no fever, chills, malaise  CV: no chest pain or palpitations  Resp: no cough or shortness of breath  GI: no nausea, vomiting, diarrhea, or constipation  Neuro: no seizures, vertigo, or headache  Msk: Left knee pain, see HPI.  10 remaining systems reviewed and negative    Objective :     Vitals:    07/14/25 0847   Resp: 14   Body mass index is 24.58 kg/m².  General: No acute distress, resting comfortably in the clinic  Neuro: alert. oriented  Eyes:

## (undated) DEVICE — STERILE PATIENT PROTECTIVE PAD FOR IMP® KNEE POSITIONERS & COHESIVE WRAP (10 / CASE): Brand: DE MAYO KNEE POSITIONER®

## (undated) DEVICE — GLOVE ORANGE PI 8   MSG9080

## (undated) DEVICE — ZIPPERED TOGA, X-LARGE: Brand: FLYTE

## (undated) DEVICE — GLOVE ORANGE PI 7 1/2   MSG9075

## (undated) DEVICE — SUTURE STRATAFIX SPRL SZ 1 L14IN ABSRB VLT L48CM CTX 1/2 SXPD2B405

## (undated) DEVICE — INTENDED FOR TISSUE SEPARATION, AND OTHER PROCEDURES THAT REQUIRE A SHARP SURGICAL BLADE TO PUNCTURE OR CUT.: Brand: BARD-PARKER ® CARBON RIB-BACK BLADES

## (undated) DEVICE — APPLICATOR MEDICATED 26 CC SOLUTION HI LT ORNG CHLORAPREP

## (undated) DEVICE — GLOVE SURG SZ 6 THK91MIL LTX FREE SYN POLYISOPRENE ANTI

## (undated) DEVICE — SUTURE VCRL SZ 1 L36IN ABSRB UD L36MM CT-1 1/2 CIR J947H

## (undated) DEVICE — ZIPPERED TOGA, 2X LARGE: Brand: FLYTE

## (undated) DEVICE — ADHESIVE SKIN CLSR 0.7ML TOP DERMBND ADV

## (undated) DEVICE — STOCKINETTE,IMPERVIOUS,12X48,STERILE: Brand: MEDLINE

## (undated) DEVICE — HANDPIECE SET WITH COAXIAL HIGH FLOW TIP AND SUCTION TUBE: Brand: INTERPULSE

## (undated) DEVICE — SUTURE STRATAFIX SPRL SZ 3-0 L5IN ABSRB UD FS L26MM 3/8 CIR SXMP2B411

## (undated) DEVICE — PACK PROCEDURE SURG SVMMC TOT KNEE

## (undated) DEVICE — CEMENT MIXING SYSTEM WITH FEMORAL BREAKWAY NOZZLE: Brand: REVOLUTION

## (undated) DEVICE — SUTURE VCRL SZ 0 L54IN ABSRB UD POLYGLACTIN 910 COAT BRAID J608H

## (undated) DEVICE — GLOVE ORANGE PI 8 1/2   MSG9085

## (undated) DEVICE — SUTURE STRATAFIX SPRL SZ 2-0 L9IN ABSRB VLT MH L36MM 1/2 SXPD2B408

## (undated) DEVICE — DRAPE,U/ SHT,SPLIT,PLAS,STERIL: Brand: MEDLINE

## (undated) DEVICE — GLOVE ORANGE PI 7   MSG9070

## (undated) DEVICE — Z DISCONTINUED USE 2272124 DRAPE SURG XL N INVASIVE 2 LAYR DISP

## (undated) DEVICE — 450 ML BOTTLE OF 0.05% CHLORHEXIDINE GLUCONATE IN 99.95% STERILE WATER FOR IRRIGATION, USP AND APPLICATOR.: Brand: IRRISEPT ANTIMICROBIAL WOUND LAVAGE

## (undated) DEVICE — GOWN,SIRUS,NONRNF,SETINSLV,XL,20/CS: Brand: MEDLINE

## (undated) DEVICE — DRESSING FOAM W4XL10IN AG SIL ADH ANTIMIC POSTOP OPTIFOAM